# Patient Record
Sex: FEMALE | Race: WHITE | Employment: FULL TIME | URBAN - METROPOLITAN AREA
[De-identification: names, ages, dates, MRNs, and addresses within clinical notes are randomized per-mention and may not be internally consistent; named-entity substitution may affect disease eponyms.]

---

## 2017-01-13 ENCOUNTER — OFFICE VISIT (OUTPATIENT)
Dept: PERINATAL CARE | Facility: HOSPITAL | Age: 42
End: 2017-01-13
Attending: OBSTETRICS & GYNECOLOGY
Payer: COMMERCIAL

## 2017-01-13 VITALS
WEIGHT: 202 LBS | DIASTOLIC BLOOD PRESSURE: 76 MMHG | BODY MASS INDEX: 30 KG/M2 | HEART RATE: 101 BPM | SYSTOLIC BLOOD PRESSURE: 112 MMHG

## 2017-01-13 DIAGNOSIS — O09.812 PREGNANCY RESULTING FROM IN VITRO FERTILIZATION, SECOND TRIMESTER: ICD-10-CM

## 2017-01-13 DIAGNOSIS — O09.522 AMA (ADVANCED MATERNAL AGE) MULTIGRAVIDA 35+, SECOND TRIMESTER: Primary | ICD-10-CM

## 2017-01-13 PROBLEM — O09.819 PREGNANCY RESULTING FROM IN VITRO FERTILIZATION (HCC): Status: ACTIVE | Noted: 2017-01-13

## 2017-01-13 PROBLEM — O09.819 PREGNANCY RESULTING FROM IN VITRO FERTILIZATION: Status: ACTIVE | Noted: 2017-01-13

## 2017-01-13 PROCEDURE — 76825 ECHO EXAM OF FETAL HEART: CPT

## 2017-01-13 PROCEDURE — 99214 OFFICE O/P EST MOD 30 MIN: CPT

## 2017-01-13 PROCEDURE — 76827 ECHO EXAM OF FETAL HEART: CPT

## 2017-01-13 PROCEDURE — 93325 DOPPLER ECHO COLOR FLOW MAPG: CPT

## 2017-01-13 NOTE — PROGRESS NOTES
Indication: IVF.   ____________________________________________________________________________  History: Age: 39 years. Maternal age at [facility]: 39 years.  Last menstrual period: 07/30/2016.  ____________________________________________________________________ of pericardial or pleural effusion. The A-V conduction is one to one and the heart rate is appropriate for gestational age. No evidence of fetal arrhythmias is seen during today's study. There is a right to left shunting across the patent foramen ovale.

## 2017-01-30 PROCEDURE — 82950 GLUCOSE TEST: CPT | Performed by: OBSTETRICS & GYNECOLOGY

## 2017-02-13 ENCOUNTER — HOSPITAL ENCOUNTER (OUTPATIENT)
Facility: HOSPITAL | Age: 42
Setting detail: OBSERVATION
Discharge: HOME | End: 2017-02-13
Attending: OBSTETRICS & GYNECOLOGY | Admitting: OBSTETRICS & GYNECOLOGY
Payer: COMMERCIAL

## 2017-02-13 VITALS
DIASTOLIC BLOOD PRESSURE: 77 MMHG | SYSTOLIC BLOOD PRESSURE: 121 MMHG | WEIGHT: 211 LBS | TEMPERATURE: 98 F | HEART RATE: 96 BPM | HEIGHT: 69 IN | RESPIRATION RATE: 18 BRPM | BODY MASS INDEX: 31.25 KG/M2

## 2017-02-13 PROBLEM — Z34.90 PREGNANCY: Status: ACTIVE | Noted: 2017-02-13

## 2017-02-13 PROBLEM — Z34.90 PREGNANCY (HCC): Status: ACTIVE | Noted: 2017-02-13

## 2017-02-13 LAB
BILIRUBIN URINE: NEGATIVE
BLOOD URINE: NEGATIVE
CONTROL RUN WITHIN 24 HOURS?: YES
GLUCOSE URINE: NEGATIVE
KETONE URINE: NEGATIVE
LEUKOCYTE ESTERASE URINE: NEGATIVE
NITRITE URINE: NEGATIVE
PH URINE: 7 (ref 5–8)
PROTEIN URINE: NEGATIVE
SPEC GRAVITY: 1.01 (ref 1–1.03)
URINE CLARITY: CLEAR
URINE COLOR: YELLOW
UROBILINOGEN URINE: 0.2

## 2017-02-13 PROCEDURE — 81002 URINALYSIS NONAUTO W/O SCOPE: CPT

## 2017-02-13 NOTE — PROGRESS NOTES
Pt is a 39year old female admitted to TR5/TR5-A. Patient presents with:  Decreased Fetal Movement     Pt is  28w2d intra-uterine pregnancy. History obtained, consents signed. Oriented to room, staff, and plan of care.

## 2017-02-13 NOTE — PROGRESS NOTES
Pt given discharge instructions. Pt verbalizes understanding of all.  Pt is comfortable going home and will follow up with her doctor on feb 21st.

## 2017-02-21 ENCOUNTER — HOSPITAL ENCOUNTER (OUTPATIENT)
Facility: HOSPITAL | Age: 42
Setting detail: OBSERVATION
Discharge: HOME | End: 2017-02-21
Attending: OBSTETRICS & GYNECOLOGY | Admitting: OBSTETRICS & GYNECOLOGY
Payer: COMMERCIAL

## 2017-02-21 VITALS
HEIGHT: 69 IN | HEART RATE: 82 BPM | DIASTOLIC BLOOD PRESSURE: 76 MMHG | SYSTOLIC BLOOD PRESSURE: 120 MMHG | BODY MASS INDEX: 32.07 KG/M2 | WEIGHT: 216.5 LBS

## 2017-02-21 LAB
ALBUMIN SERPL-MCNC: 2.7 G/DL (ref 3.5–4.8)
ALP LIVER SERPL-CCNC: 87 U/L (ref 37–98)
ALT SERPL-CCNC: 37 U/L (ref 14–54)
AST SERPL-CCNC: 25 U/L (ref 15–41)
BASOPHILS # BLD AUTO: 0.04 X10(3) UL (ref 0–0.1)
BASOPHILS NFR BLD AUTO: 0.4 %
BILIRUB SERPL-MCNC: 0.2 MG/DL (ref 0.1–2)
BILIRUBIN URINE: NEGATIVE
BLOOD URINE: NEGATIVE
BUN BLD-MCNC: 7 MG/DL (ref 8–20)
CALCIUM BLD-MCNC: 8.8 MG/DL (ref 8.3–10.3)
CHLORIDE: 107 MMOL/L (ref 101–111)
CO2: 23 MMOL/L (ref 22–32)
CONTROL RUN WITHIN 24 HOURS?: YES
CREAT BLD-MCNC: 0.51 MG/DL (ref 0.55–1.02)
EOSINOPHIL # BLD AUTO: 0.08 X10(3) UL (ref 0–0.3)
EOSINOPHIL NFR BLD AUTO: 0.8 %
ERYTHROCYTE [DISTWIDTH] IN BLOOD BY AUTOMATED COUNT: 13.2 % (ref 11.5–16)
GLUCOSE BLD-MCNC: 95 MG/DL (ref 70–99)
GLUCOSE URINE: NEGATIVE
HCT VFR BLD AUTO: 37.1 % (ref 34–50)
HGB BLD-MCNC: 13.1 G/DL (ref 12–16)
IMMATURE GRANULOCYTE COUNT: 0.12 X10(3) UL (ref 0–1)
IMMATURE GRANULOCYTE RATIO %: 1.2 %
KETONE URINE: NEGATIVE
LEUKOCYTE ESTERASE URINE: NEGATIVE
LYMPHOCYTES # BLD AUTO: 1.95 X10(3) UL (ref 0.9–4)
LYMPHOCYTES NFR BLD AUTO: 20.1 %
M PROTEIN MFR SERPL ELPH: 6.2 G/DL (ref 6.1–8.3)
MCH RBC QN AUTO: 32.4 PG (ref 27–33.2)
MCHC RBC AUTO-ENTMCNC: 35.3 G/DL (ref 31–37)
MCV RBC AUTO: 91.8 FL (ref 81–100)
MONOCYTES # BLD AUTO: 0.9 X10(3) UL (ref 0.1–0.6)
MONOCYTES NFR BLD AUTO: 9.3 %
NEUTROPHIL ABS PRELIM: 6.6 X10 (3) UL (ref 1.3–6.7)
NEUTROPHILS # BLD AUTO: 6.6 X10(3) UL (ref 1.3–6.7)
NEUTROPHILS NFR BLD AUTO: 68.2 %
NITRITE URINE: NEGATIVE
PH URINE: 6.5 (ref 5–8)
PLATELET # BLD AUTO: 284 10(3)UL (ref 150–450)
POTASSIUM SERPL-SCNC: 3.8 MMOL/L (ref 3.6–5.1)
PROTEIN URINE: NEGATIVE
RBC # BLD AUTO: 4.04 X10(6)UL (ref 3.8–5.1)
RED CELL DISTRIBUTION WIDTH-SD: 43.8 FL (ref 35.1–46.3)
SODIUM SERPL-SCNC: 139 MMOL/L (ref 136–144)
SPEC GRAVITY: 1.01 (ref 1–1.03)
URIC ACID: 2.2 MG/DL (ref 2.4–8)
URINE CLARITY: CLEAR
URINE COLOR: YELLOW
UROBILINOGEN URINE: 0.2
WBC # BLD AUTO: 9.7 X10(3) UL (ref 4–13)

## 2017-02-21 PROCEDURE — 84550 ASSAY OF BLOOD/URIC ACID: CPT | Performed by: OBSTETRICS & GYNECOLOGY

## 2017-02-21 PROCEDURE — 81002 URINALYSIS NONAUTO W/O SCOPE: CPT

## 2017-02-21 PROCEDURE — 85025 COMPLETE CBC W/AUTO DIFF WBC: CPT | Performed by: OBSTETRICS & GYNECOLOGY

## 2017-02-21 PROCEDURE — 80053 COMPREHEN METABOLIC PANEL: CPT | Performed by: OBSTETRICS & GYNECOLOGY

## 2017-02-22 NOTE — PROGRESS NOTES
Discharge instruction given to pt, pt going home ambulatory accompanied with spouse in stable condition, all pt belongings sent with pt on discharge.

## 2017-02-22 NOTE — PROGRESS NOTES
Updated  about the pt BP and PIH labs WNL  Orders received to discharge pt home and follow up in the office next weeks with BP recordings at home  RN informed the pt to check BP TID @ home and record and if BP above 150/90 x 2 15 min apart call

## 2017-03-11 ENCOUNTER — APPOINTMENT (OUTPATIENT)
Dept: ULTRASOUND IMAGING | Facility: HOSPITAL | Age: 42
End: 2017-03-11
Attending: OBSTETRICS & GYNECOLOGY
Payer: COMMERCIAL

## 2017-03-11 ENCOUNTER — HOSPITAL ENCOUNTER (INPATIENT)
Facility: HOSPITAL | Age: 42
LOS: 5 days | Discharge: HOME | End: 2017-03-16
Attending: OBSTETRICS & GYNECOLOGY | Admitting: OBSTETRICS & GYNECOLOGY
Payer: COMMERCIAL

## 2017-03-11 PROCEDURE — 86901 BLOOD TYPING SEROLOGIC RH(D): CPT | Performed by: OBSTETRICS & GYNECOLOGY

## 2017-03-11 PROCEDURE — 76805 OB US >/= 14 WKS SNGL FETUS: CPT

## 2017-03-11 PROCEDURE — 82575 CREATININE CLEARANCE TEST: CPT | Performed by: OBSTETRICS & GYNECOLOGY

## 2017-03-11 PROCEDURE — 84156 ASSAY OF PROTEIN URINE: CPT | Performed by: OBSTETRICS & GYNECOLOGY

## 2017-03-11 PROCEDURE — 86850 RBC ANTIBODY SCREEN: CPT | Performed by: OBSTETRICS & GYNECOLOGY

## 2017-03-11 PROCEDURE — 85025 COMPLETE CBC W/AUTO DIFF WBC: CPT | Performed by: OBSTETRICS & GYNECOLOGY

## 2017-03-11 PROCEDURE — 80053 COMPREHEN METABOLIC PANEL: CPT | Performed by: OBSTETRICS & GYNECOLOGY

## 2017-03-11 PROCEDURE — 84550 ASSAY OF BLOOD/URIC ACID: CPT | Performed by: OBSTETRICS & GYNECOLOGY

## 2017-03-11 PROCEDURE — 86900 BLOOD TYPING SEROLOGIC ABO: CPT | Performed by: OBSTETRICS & GYNECOLOGY

## 2017-03-11 PROCEDURE — 82565 ASSAY OF CREATININE: CPT | Performed by: OBSTETRICS & GYNECOLOGY

## 2017-03-11 RX ORDER — DOCUSATE SODIUM 100 MG/1
100 CAPSULE, LIQUID FILLED ORAL 2 TIMES DAILY
Status: DISCONTINUED | OUTPATIENT
Start: 2017-03-11 | End: 2017-03-14

## 2017-03-11 RX ORDER — BETAMETHASONE SODIUM PHOSPHATE AND BETAMETHASONE ACETATE 3; 3 MG/ML; MG/ML
INJECTION, SUSPENSION INTRA-ARTICULAR; INTRALESIONAL; INTRAMUSCULAR; SOFT TISSUE
Status: DISPENSED
Start: 2017-03-11 | End: 2017-03-12

## 2017-03-11 RX ORDER — SODIUM CHLORIDE, SODIUM LACTATE, POTASSIUM CHLORIDE, CALCIUM CHLORIDE 600; 310; 30; 20 MG/100ML; MG/100ML; MG/100ML; MG/100ML
INJECTION, SOLUTION INTRAVENOUS CONTINUOUS
Status: DISCONTINUED | OUTPATIENT
Start: 2017-03-11 | End: 2017-03-13

## 2017-03-11 RX ORDER — ACETAMINOPHEN 500 MG
500 TABLET ORAL EVERY 6 HOURS PRN
Status: DISCONTINUED | OUTPATIENT
Start: 2017-03-11 | End: 2017-03-14

## 2017-03-11 RX ORDER — BETAMETHASONE SODIUM PHOSPHATE AND BETAMETHASONE ACETATE 3; 3 MG/ML; MG/ML
12 INJECTION, SUSPENSION INTRA-ARTICULAR; INTRALESIONAL; INTRAMUSCULAR; SOFT TISSUE EVERY 24 HOURS
Status: COMPLETED | OUTPATIENT
Start: 2017-03-11 | End: 2017-03-12

## 2017-03-11 RX ORDER — ZOLPIDEM TARTRATE 5 MG/1
5 TABLET ORAL NIGHTLY PRN
Status: DISCONTINUED | OUTPATIENT
Start: 2017-03-11 | End: 2017-03-14

## 2017-03-11 RX ORDER — CALCIUM CARBONATE 200(500)MG
1000 TABLET,CHEWABLE ORAL
Status: DISCONTINUED | OUTPATIENT
Start: 2017-03-11 | End: 2017-03-14

## 2017-03-11 RX ORDER — ACETAMINOPHEN 500 MG
1000 TABLET ORAL EVERY 6 HOURS PRN
Status: DISCONTINUED | OUTPATIENT
Start: 2017-03-11 | End: 2017-03-14

## 2017-03-11 RX ORDER — CHOLECALCIFEROL (VITAMIN D3) 25 MCG
1 TABLET,CHEWABLE ORAL DAILY
Status: DISCONTINUED | OUTPATIENT
Start: 2017-03-11 | End: 2017-03-14

## 2017-03-11 RX ORDER — CALCIUM GLUCONATE 94 MG/ML
1 INJECTION, SOLUTION INTRAVENOUS ONCE AS NEEDED
Status: ACTIVE | OUTPATIENT
Start: 2017-03-11 | End: 2017-03-11

## 2017-03-12 LAB
ALBUMIN SERPL-MCNC: 2.6 G/DL (ref 3.5–4.8)
ALP LIVER SERPL-CCNC: 116 U/L (ref 37–98)
ALT SERPL-CCNC: 41 U/L (ref 14–54)
ANTIBODY SCREEN: NEGATIVE
AST SERPL-CCNC: 26 U/L (ref 15–41)
BASOPHILS # BLD AUTO: 0.05 X10(3) UL (ref 0–0.1)
BASOPHILS NFR BLD AUTO: 0.3 %
BILIRUB SERPL-MCNC: 0.1 MG/DL (ref 0.1–2)
BILIRUBIN URINE: NEGATIVE
BLOOD URINE: NEGATIVE
BUN BLD-MCNC: 13 MG/DL (ref 8–20)
CALCIUM BLD-MCNC: 9.3 MG/DL (ref 8.3–10.3)
CHLORIDE: 107 MMOL/L (ref 101–111)
CO2: 24 MMOL/L (ref 22–32)
CONTROL RUN WITHIN 24 HOURS?: YES
CREAT BLD-MCNC: 0.58 MG/DL (ref 0.55–1.02)
EOSINOPHIL # BLD AUTO: 0.13 X10(3) UL (ref 0–0.3)
EOSINOPHIL NFR BLD AUTO: 0.9 %
ERYTHROCYTE [DISTWIDTH] IN BLOOD BY AUTOMATED COUNT: 13 % (ref 11.5–16)
GLUCOSE BLD-MCNC: 113 MG/DL (ref 70–99)
GLUCOSE URINE: NEGATIVE
HCT VFR BLD AUTO: 36 % (ref 34–50)
HGB BLD-MCNC: 12.9 G/DL (ref 12–16)
IMMATURE GRANULOCYTE COUNT: 0.09 X10(3) UL (ref 0–1)
IMMATURE GRANULOCYTE RATIO %: 0.6 %
KETONE URINE: NEGATIVE
LYMPHOCYTES # BLD AUTO: 2.16 X10(3) UL (ref 0.9–4)
LYMPHOCYTES NFR BLD AUTO: 14.4 %
M PROTEIN MFR SERPL ELPH: 5.5 G/DL (ref 6.1–8.3)
MCH RBC QN AUTO: 32.8 PG (ref 27–33.2)
MCHC RBC AUTO-ENTMCNC: 35.8 G/DL (ref 31–37)
MCV RBC AUTO: 91.6 FL (ref 81–100)
MONOCYTES # BLD AUTO: 1.3 X10(3) UL (ref 0.1–0.6)
MONOCYTES NFR BLD AUTO: 8.7 %
NEUTROPHIL ABS PRELIM: 11.24 X10 (3) UL (ref 1.3–6.7)
NEUTROPHILS # BLD AUTO: 11.24 X10(3) UL (ref 1.3–6.7)
NEUTROPHILS NFR BLD AUTO: 75.1 %
NITRITE URINE: NEGATIVE
PH URINE: 5.5 (ref 5–8)
PLATELET # BLD AUTO: 279 10(3)UL (ref 150–450)
POTASSIUM SERPL-SCNC: 4 MMOL/L (ref 3.6–5.1)
PROTEIN URINE: NEGATIVE
RBC # BLD AUTO: 3.93 X10(6)UL (ref 3.8–5.1)
RED CELL DISTRIBUTION WIDTH-SD: 43 FL (ref 35.1–46.3)
RH BLOOD TYPE: POSITIVE
SODIUM SERPL-SCNC: 141 MMOL/L (ref 136–144)
SPEC GRAVITY: 1 (ref 1–1.03)
T PALLIDUM AB SER QL IA: NONREACTIVE
URIC ACID: 2.7 MG/DL (ref 2.4–8)
URINE CLARITY: CLEAR
URINE COLOR: YELLOW
UROBILINOGEN URINE: 0.2
WBC # BLD AUTO: 15 X10(3) UL (ref 4–13)

## 2017-03-12 PROCEDURE — 81002 URINALYSIS NONAUTO W/O SCOPE: CPT

## 2017-03-12 PROCEDURE — 86780 TREPONEMA PALLIDUM: CPT | Performed by: OBSTETRICS & GYNECOLOGY

## 2017-03-12 RX ORDER — BUPROPION HYDROCHLORIDE 150 MG/1
150 TABLET, EXTENDED RELEASE ORAL DAILY
Status: DISCONTINUED | OUTPATIENT
Start: 2017-03-12 | End: 2017-03-14

## 2017-03-12 RX ORDER — ACETAMINOPHEN AND CODEINE PHOSPHATE 300; 30 MG/1; MG/1
2 TABLET ORAL EVERY 4 HOURS PRN
Status: DISCONTINUED | OUTPATIENT
Start: 2017-03-12 | End: 2017-03-16

## 2017-03-12 RX ORDER — NIFEDIPINE 20 MG/1
20 CAPSULE ORAL EVERY 6 HOURS SCHEDULED
Status: DISCONTINUED | OUTPATIENT
Start: 2017-03-12 | End: 2017-03-14

## 2017-03-12 RX ORDER — DESVENLAFAXINE 100 MG/1
100 TABLET, EXTENDED RELEASE ORAL DAILY
Status: DISCONTINUED | OUTPATIENT
Start: 2017-03-12 | End: 2017-03-14

## 2017-03-12 RX ORDER — HYDROMORPHONE HYDROCHLORIDE 1 MG/ML
2 INJECTION, SOLUTION INTRAMUSCULAR; INTRAVENOUS; SUBCUTANEOUS EVERY 2 HOUR PRN
Status: DISCONTINUED | OUTPATIENT
Start: 2017-03-12 | End: 2017-03-13

## 2017-03-12 RX ORDER — HYDROMORPHONE HYDROCHLORIDE 1 MG/ML
1 INJECTION, SOLUTION INTRAMUSCULAR; INTRAVENOUS; SUBCUTANEOUS EVERY 2 HOUR PRN
Status: DISCONTINUED | OUTPATIENT
Start: 2017-03-12 | End: 2017-03-13

## 2017-03-12 NOTE — PLAN OF CARE
Dr Jay Jay Porter notified of patient including fetal heart rate and uterine activity. md notified this rn is not picking up uterine activity, and is unable to palpate contractions.  md notified patient is feeling lower abdominal pain every 4-5 minutes, feels like sh

## 2017-03-12 NOTE — PLAN OF CARE
Pt is a 39year old  at 32 weeks here for lower abdominal pain. Pt states pain began between 4786-5148, is constant, and located all in her lower abdomen with a little more intensity towards her right groin area.  Pt denies any difficulty urinating or b

## 2017-03-12 NOTE — CONSULTS
[facility]  Maternal-Fetal Medicine Inpatient Consultation    Date of Admission:  3/11/2017  Date of Consult:  3/12/2017    Reason for Consult:    labor    History of Present Illness:  Ke Cortés is a a(n) 39year old female.   wit Intravenous, Q2H PRN  •  BuPROPion HCl ER (SR) (WELLBUTRIN SR) 12 hr tab 150 mg, 150 mg, Oral, Daily  •  Desvenlafaxine Succinate ER (PRISTIQ) 24 hr tab 100 mg, 100 mg, Oral, Daily  •  [START ON 3/15/2017] influenza vaccine split quad (FLUARIX) ages 1 & ol 03/11/2017    03/11/2017   CA 9.3 03/11/2017   ALB 2.6 03/11/2017   ALKPHO 116 03/11/2017   BILT 0.1 03/11/2017   TP 5.5 03/11/2017   AST 26 03/11/2017   ALT 41 03/11/2017       NARRATIVE:   We discussed the morbidity and mortality associated with p  labor can be a complication of infections unrelated to the genital tract, such as appendicitis, cholecystitis, pneumonia, periodontal disease, and pyelonephritis.  This likely results from transient low-grade bacteremia and/or endotoxemia leading to expect that she will be in the hospital for 2-3 days. Thank you for allowing me to participate in the care of your patient. Please do not hesitate to contact me if additional questions or concerns arise.   The majority of the time (>50%) was spent in re

## 2017-03-12 NOTE — H&P
[facility] Angella Melendrez Patient Status:  Observation    1975 MRN [id number]   [facility] 1NW-A Attending Alexandria Arias MD   Hosp Day # 1 PCP David Curtis MD     Subjective:  38 yo  at 28

## 2017-03-12 NOTE — PROGRESS NOTES
At 0815: Pt given Tylenol 1000mg for new onset headache (pain 3/10) and now states HA is \"better\" (pain 1/10). Pt sitting semi-fowlers in bed, visitors at bedside. Pt able to brush teeth, wash face and change gown without complaint.  Pt denies contrac

## 2017-03-12 NOTE — PLAN OF CARE
Pt up to bathroom, states there was blood upon wiping. Pt states it was vaginal bleeding. This rn called dr Divina Pinon, requesting to perform SVE.  Orders rec'd

## 2017-03-13 PROCEDURE — 76805 OB US >/= 14 WKS SNGL FETUS: CPT | Performed by: OBSTETRICS & GYNECOLOGY

## 2017-03-13 PROCEDURE — 76819 FETAL BIOPHYS PROFIL W/O NST: CPT | Performed by: OBSTETRICS & GYNECOLOGY

## 2017-03-13 RX ORDER — EPHEDRINE SULFATE 50 MG/ML
5 INJECTION, SOLUTION INTRAVENOUS AS NEEDED
Status: DISCONTINUED | OUTPATIENT
Start: 2017-03-13 | End: 2017-03-14

## 2017-03-13 RX ORDER — HYDROMORPHONE HYDROCHLORIDE 1 MG/ML
1 INJECTION, SOLUTION INTRAMUSCULAR; INTRAVENOUS; SUBCUTANEOUS EVERY 2 HOUR PRN
Status: DISCONTINUED | OUTPATIENT
Start: 2017-03-13 | End: 2017-03-16

## 2017-03-13 RX ORDER — DEXTROSE, SODIUM CHLORIDE, SODIUM LACTATE, POTASSIUM CHLORIDE, AND CALCIUM CHLORIDE 5; .6; .31; .03; .02 G/100ML; G/100ML; G/100ML; G/100ML; G/100ML
INJECTION, SOLUTION INTRAVENOUS AS NEEDED
Status: DISCONTINUED | OUTPATIENT
Start: 2017-03-13 | End: 2017-03-14

## 2017-03-13 RX ORDER — HYDROMORPHONE HYDROCHLORIDE 1 MG/ML
INJECTION, SOLUTION INTRAMUSCULAR; INTRAVENOUS; SUBCUTANEOUS
Status: COMPLETED
Start: 2017-03-13 | End: 2017-03-13

## 2017-03-13 RX ORDER — TERBUTALINE SULFATE 1 MG/ML
0.25 INJECTION, SOLUTION SUBCUTANEOUS AS NEEDED
Status: DISCONTINUED | OUTPATIENT
Start: 2017-03-13 | End: 2017-03-14

## 2017-03-13 RX ORDER — IBUPROFEN 600 MG/1
600 TABLET ORAL ONCE AS NEEDED
Status: DISCONTINUED | OUTPATIENT
Start: 2017-03-13 | End: 2017-03-14

## 2017-03-13 RX ORDER — SODIUM CHLORIDE, SODIUM LACTATE, POTASSIUM CHLORIDE, CALCIUM CHLORIDE 600; 310; 30; 20 MG/100ML; MG/100ML; MG/100ML; MG/100ML
INJECTION, SOLUTION INTRAVENOUS CONTINUOUS
Status: DISCONTINUED | OUTPATIENT
Start: 2017-03-13 | End: 2017-03-14

## 2017-03-13 RX ORDER — SODIUM CHLORIDE, SODIUM LACTATE, POTASSIUM CHLORIDE, CALCIUM CHLORIDE 600; 310; 30; 20 MG/100ML; MG/100ML; MG/100ML; MG/100ML
INJECTION, SOLUTION INTRAVENOUS CONTINUOUS
Status: DISCONTINUED | OUTPATIENT
Start: 2017-03-13 | End: 2017-03-13

## 2017-03-13 RX ORDER — HYDROMORPHONE HYDROCHLORIDE 1 MG/ML
2 INJECTION, SOLUTION INTRAMUSCULAR; INTRAVENOUS; SUBCUTANEOUS EVERY 2 HOUR PRN
Status: DISCONTINUED | OUTPATIENT
Start: 2017-03-13 | End: 2017-03-13

## 2017-03-13 RX ORDER — NALBUPHINE HCL 10 MG/ML
2.5 AMPUL (ML) INJECTION
Status: DISCONTINUED | OUTPATIENT
Start: 2017-03-13 | End: 2017-03-16

## 2017-03-13 NOTE — CONSULTS
[facility]  Maternal-Fetal Medicine Inpatient Consultation    Date of Admission:  3/11/2017  Date of Consult:  3/13/2017    Reason for Consult:    labor    History of Present Illness:  Bess Hurst is a a(n) 39year old female.    Wh Intravenous, Continuous    Physical examination:  Physical Exam  /67 mmHg  Pulse 68  Temp(Src) 98.1 °F (36.7 °C) (Temporal)  Resp 16  Ht 5' 9\" (1.753 m)  SpO2 98%  LMP 07/30/2016  Gen:  A&O, NAD  Abd: soft, nontender gravid uterus  Ext: trace edema,

## 2017-03-13 NOTE — IMAGING NOTE
Indication: ivf,premature labor. Maternal age (39 years). ____________________________________________________________________________  History: Age: 39 years. Maternal age at [facility]: 39 years.   ____________________________________________________________ seen today. The AFV is normal. She understands that ultrasound exam cannot exclude genetic abnormalities. The limitations of ultrasound were discussed. IMPRESSION:    1.  IUP at 32 2/7 wks    2. Scan consistent with dates  3.  depression  4.   A

## 2017-03-13 NOTE — PLAN OF CARE
Problem: ANXIETY  Goal: Will report anxiety at manageable levels  INTERVENTIONS:  - Administer medication as ordered  - Teach and rehearse alternative coping skills  - Provide emotional support with 1:1 interaction with staff   Outcome: Progressing

## 2017-03-13 NOTE — PROGRESS NOTES
HD#2, 32 2/7 weeks  Patient with increased UCx over last 2 hours, Procardia given at 0600. +bloody show when up to BR. Cramping at 6/10 but not as painful as admission.   S/p BMTZ x 2 doses  IV bolus in progress    , mod LTV + accels, no decels  UCx

## 2017-03-13 NOTE — PROGRESS NOTES
now32 2/7 weeks with PTL. Cramping this AM after resting comfortably after Ambien. Reactive NST. Report to WASHINGTON Womack RN

## 2017-03-13 NOTE — PLAN OF CARE
Problem: Patient/Family Goals  Goal: Patient/Family Long Term Goal  Patient’s Long Term Goal: to maintain pregnancy until 39 weeks    Interventions:    - See additional Care Plan goals for specific interventions   Outcome: Progressing  Goal: Patient/Family

## 2017-03-14 LAB
BASOPHILS # BLD AUTO: 0.06 X10(3) UL (ref 0–0.1)
BASOPHILS NFR BLD AUTO: 0.4 %
EOSINOPHIL # BLD AUTO: 0.04 X10(3) UL (ref 0–0.3)
EOSINOPHIL NFR BLD AUTO: 0.2 %
ERYTHROCYTE [DISTWIDTH] IN BLOOD BY AUTOMATED COUNT: 13.3 % (ref 11.5–16)
HCT VFR BLD AUTO: 34.7 % (ref 34–50)
HGB BLD-MCNC: 12.3 G/DL (ref 12–16)
IMMATURE GRANULOCYTE COUNT: 0.2 X10(3) UL (ref 0–1)
IMMATURE GRANULOCYTE RATIO %: 1.2 %
LYMPHOCYTES # BLD AUTO: 1.79 X10(3) UL (ref 0.9–4)
LYMPHOCYTES NFR BLD AUTO: 10.8 %
MCH RBC QN AUTO: 32.9 PG (ref 27–33.2)
MCHC RBC AUTO-ENTMCNC: 35.4 G/DL (ref 31–37)
MCV RBC AUTO: 92.8 FL (ref 81–100)
MONOCYTES # BLD AUTO: 1.97 X10(3) UL (ref 0.1–0.6)
MONOCYTES NFR BLD AUTO: 11.9 %
NEUTROPHIL ABS PRELIM: 12.5 X10 (3) UL (ref 1.3–6.7)
NEUTROPHILS # BLD AUTO: 12.5 X10(3) UL (ref 1.3–6.7)
NEUTROPHILS NFR BLD AUTO: 75.5 %
PLATELET # BLD AUTO: 249 10(3)UL (ref 150–450)
RBC # BLD AUTO: 3.74 X10(6)UL (ref 3.8–5.1)
RED CELL DISTRIBUTION WIDTH-SD: 45 FL (ref 35.1–46.3)
WBC # BLD AUTO: 16.6 X10(3) UL (ref 4–13)

## 2017-03-14 PROCEDURE — 88307 TISSUE EXAM BY PATHOLOGIST: CPT | Performed by: OBSTETRICS & GYNECOLOGY

## 2017-03-14 PROCEDURE — 85025 COMPLETE CBC W/AUTO DIFF WBC: CPT | Performed by: OBSTETRICS & GYNECOLOGY

## 2017-03-14 RX ORDER — HYDROCODONE BITARTRATE AND ACETAMINOPHEN 5; 325 MG/1; MG/1
1 TABLET ORAL EVERY 4 HOURS PRN
Status: DISCONTINUED | OUTPATIENT
Start: 2017-03-14 | End: 2017-03-16

## 2017-03-14 RX ORDER — DESVENLAFAXINE 100 MG/1
100 TABLET, EXTENDED RELEASE ORAL DAILY
Status: DISCONTINUED | OUTPATIENT
Start: 2017-03-14 | End: 2017-03-16

## 2017-03-14 RX ORDER — OXYTOCIN 10 [USP'U]/ML
INJECTION, SOLUTION INTRAMUSCULAR; INTRAVENOUS
Status: DISPENSED
Start: 2017-03-14 | End: 2017-03-14

## 2017-03-14 RX ORDER — DOCUSATE SODIUM 100 MG/1
100 CAPSULE, LIQUID FILLED ORAL
Status: DISCONTINUED | OUTPATIENT
Start: 2017-03-14 | End: 2017-03-16

## 2017-03-14 RX ORDER — SIMETHICONE 80 MG
80 TABLET,CHEWABLE ORAL 3 TIMES DAILY PRN
Status: DISCONTINUED | OUTPATIENT
Start: 2017-03-14 | End: 2017-03-16

## 2017-03-14 RX ORDER — IBUPROFEN 600 MG/1
600 TABLET ORAL EVERY 6 HOURS
Status: DISCONTINUED | OUTPATIENT
Start: 2017-03-14 | End: 2017-03-16

## 2017-03-14 RX ORDER — BUPROPION HYDROCHLORIDE 75 MG/1
150 TABLET ORAL 2 TIMES DAILY
Status: DISCONTINUED | OUTPATIENT
Start: 2017-03-14 | End: 2017-03-16

## 2017-03-14 RX ORDER — OXYTOCIN 10 [USP'U]/ML
20 INJECTION, SOLUTION INTRAMUSCULAR; INTRAVENOUS ONCE
Status: DISCONTINUED | OUTPATIENT
Start: 2017-03-14 | End: 2017-03-14

## 2017-03-14 RX ORDER — HYDROCODONE BITARTRATE AND ACETAMINOPHEN 5; 325 MG/1; MG/1
2 TABLET ORAL EVERY 4 HOURS PRN
Status: DISCONTINUED | OUTPATIENT
Start: 2017-03-14 | End: 2017-03-16

## 2017-03-14 RX ORDER — ACETAMINOPHEN 325 MG/1
650 TABLET ORAL EVERY 4 HOURS PRN
Status: DISCONTINUED | OUTPATIENT
Start: 2017-03-14 | End: 2017-03-16

## 2017-03-14 RX ORDER — BISACODYL 10 MG
10 SUPPOSITORY, RECTAL RECTAL ONCE AS NEEDED
Status: ACTIVE | OUTPATIENT
Start: 2017-03-14 | End: 2017-03-14

## 2017-03-14 RX ORDER — CHOLECALCIFEROL (VITAMIN D3) 25 MCG
1 TABLET,CHEWABLE ORAL DAILY
Status: DISCONTINUED | OUTPATIENT
Start: 2017-03-14 | End: 2017-03-16

## 2017-03-14 RX ORDER — ZOLPIDEM TARTRATE 5 MG/1
5 TABLET ORAL NIGHTLY PRN
Status: DISCONTINUED | OUTPATIENT
Start: 2017-03-14 | End: 2017-03-16

## 2017-03-14 RX ORDER — MULTIPLE VITAMINS W/ MINERALS TAB 9MG-400MCG
1 TAB ORAL DAILY
Status: DISCONTINUED | OUTPATIENT
Start: 2017-03-14 | End: 2017-03-16

## 2017-03-14 NOTE — PROGRESS NOTES
Patient had been sleeping this morning, asked  to have her let us know when awake for assessment. Went to check on patient and she is not in room, up in nicu.

## 2017-03-14 NOTE — PROGRESS NOTES
Pt admit to mother baby unit instructed to call for assistance when needs to get up to void, also instructed how to use call light tv. Verb understanding of all instructions and call light with in reach. Set up and instructed how to use breast pump.

## 2017-03-14 NOTE — PROGRESS NOTES
Report given to Carmen Andre RN. Patient taken to NICU via wheelchair, then transferred to mother baby unit in stable condition.

## 2017-03-14 NOTE — PROGRESS NOTES
[facility]  Post-Partum Progress Note    [facility]:  [facility]    1975 MRN [id number]   [facility] 1SW-J Attending Ewa José MD   Hosp Day # 3 PCP Joan Buchanan MD     SUBJECTIVE:  Patient doing we

## 2017-03-14 NOTE — PROGRESS NOTES
Patient had Brady consult today. Awaiting change of shift and then NICU will provide tour for her and spouse. Report to oncoming night shift.

## 2017-03-14 NOTE — PLAN OF CARE
ANTEPARTUM/LABOR and DELIVERY    • Maintain pregnancy as long as maternal and/or fetal condition is stable Completed    • Anxiety is at manageable level Completed    • Demonstrates ability to cope with hospitalization/illness Completed        ANXIETY    •

## 2017-03-14 NOTE — PAYOR COMM NOTE
Attending Physician: Maribel Diamond MD    Review Type: ADMISSION   Reviewer: Divina Vo       Date: March 14, 2017 - 11:45 AM  Payor: [employer]  Authorization Number: [id number]  Admit date: 3/11/2017 10:06 PM   Admitted from Emergency Dept.: no doses  IV bolus in progress    Patient began jazmyn with increased intensity around 6pm. SVE at 2-3 cm. Per discussion with Dr Keagan Rodriguez / [facility] this am no tocolysis. Patient s/p BMTZ X 2 doses and Magnesium for neuroprotection 2 days ago.   , mod

## 2017-03-14 NOTE — PROGRESS NOTES
Patient has been up on feet, into bathroom, had been up to NICU x 2 and out walking. Resting in bed now, will re-check BP.

## 2017-03-14 NOTE — L&D DELIVERY NOTE
Vaginal Delivery Note      Florentino Melendrez Patient Status:  Inpatient    1975 MRN [id number]   [facility] 1NW-A Attending Tino Swanson MD   Hosp Day # 3 PCP Jyotsna Mitchell,

## 2017-03-14 NOTE — BH PROGRESS NOTE
Behavioral health consult was ordered due to patient's EPDS score of 11. Writer attempted to meet with patient today however patient declined assessment due to plan to nap and visit infant in NICU.  Writer did discuss patient's behavioral health history wi

## 2017-03-14 NOTE — PROGRESS NOTES
Progress  Patient began jazmyn with increased intensity around 6pm. SVE at 2-3 cm. Per discussion with Dr Kassie Hand / [facility] this am no tocolysis. Patient s/p BMTZ X 2 doses and Magnesium for neuroprotection 2 days ago. , mod LTV + accels. UCx ever

## 2017-03-14 NOTE — PROGRESS NOTES
Patient left unit with , states needs to get some fresh air, advised she should not be leaving hospital building, states understanding but still going outside on grounds.

## 2017-03-14 NOTE — PLAN OF CARE
ANTEPARTUM/LABOR and DELIVERY    • Maintain pregnancy as long as maternal and/or fetal condition is stable Progressing    • Anxiety is at manageable level Progressing    • Demonstrates ability to cope with hospitalization/illness Progressing        ANXIETY

## 2017-03-14 NOTE — CM/SW NOTE
CM met with patient to review insurance and PCP for infant. Patient stated that infant will be added to their [employer] PPO and Dr Jeevan Rowe will be the PCP for infant.  CM reviewed Auth process and reminded parents to call insurance and let them know that she

## 2017-03-15 LAB
BASOPHILS # BLD AUTO: 0.08 X10(3) UL (ref 0–0.1)
BASOPHILS NFR BLD AUTO: 0.8 %
EOSINOPHIL # BLD AUTO: 0.24 X10(3) UL (ref 0–0.3)
EOSINOPHIL NFR BLD AUTO: 2.3 %
ERYTHROCYTE [DISTWIDTH] IN BLOOD BY AUTOMATED COUNT: 13.6 % (ref 11.5–16)
HCT VFR BLD AUTO: 37.1 % (ref 34–50)
HGB BLD-MCNC: 13.2 G/DL (ref 12–16)
IMMATURE GRANULOCYTE COUNT: 0.18 X10(3) UL (ref 0–1)
IMMATURE GRANULOCYTE RATIO %: 1.7 %
LYMPHOCYTES # BLD AUTO: 2.53 X10(3) UL (ref 0.9–4)
LYMPHOCYTES NFR BLD AUTO: 24.5 %
MCH RBC QN AUTO: 32.9 PG (ref 27–33.2)
MCHC RBC AUTO-ENTMCNC: 35.6 G/DL (ref 31–37)
MCV RBC AUTO: 92.5 FL (ref 81–100)
MONOCYTES # BLD AUTO: 1.04 X10(3) UL (ref 0.1–0.6)
MONOCYTES NFR BLD AUTO: 10.1 %
NEUTROPHIL ABS PRELIM: 6.27 X10 (3) UL (ref 1.3–6.7)
NEUTROPHILS # BLD AUTO: 6.27 X10(3) UL (ref 1.3–6.7)
NEUTROPHILS NFR BLD AUTO: 60.6 %
PLATELET # BLD AUTO: 277 10(3)UL (ref 150–450)
RBC # BLD AUTO: 4.01 X10(6)UL (ref 3.8–5.1)
RED CELL DISTRIBUTION WIDTH-SD: 45.4 FL (ref 35.1–46.3)
WBC # BLD AUTO: 10.3 X10(3) UL (ref 4–13)

## 2017-03-15 PROCEDURE — 85025 COMPLETE CBC W/AUTO DIFF WBC: CPT | Performed by: OBSTETRICS & GYNECOLOGY

## 2017-03-15 NOTE — PROGRESS NOTES
[facility]  Post-Partum Progress Note    [address] Patient Status:  Inpatient    1975 MRN [id number]   [facility] 1SW-J Attending Rosie De Oliveira MD   Hosp Day # 4 PCP Dante Betts MD     SUBJECTIVE:  Patient doing we

## 2017-03-15 NOTE — BH PROGRESS NOTE
Patient is declining full consult for behavioral health in response to EPDS score. Writer reviewed progress notes from SW, with whom she met today,  as well as spoke with primary RN.  Met with patient for short interview, in which she presented with natalya

## 2017-03-15 NOTE — CM/SW NOTE
03/15/17 1100   CM/SW Referral Data   Referral Source Social Work (self-referral); Patient   Reason for Referral Psychoscial assessment   Informant Patient       SW met with pt due to new born admission into the NICU.  Pt has just delivered her first chil

## 2017-03-16 VITALS
OXYGEN SATURATION: 98 % | HEIGHT: 69 IN | HEART RATE: 78 BPM | DIASTOLIC BLOOD PRESSURE: 91 MMHG | SYSTOLIC BLOOD PRESSURE: 130 MMHG | RESPIRATION RATE: 18 BRPM | TEMPERATURE: 98 F

## 2017-03-16 NOTE — PROGRESS NOTES
[facility]  Post-Partum Progress Note    [address] [place] Patient Status:  Inpatient    1975 MRN [id number]   [facility] 1SW-J Attending Maribel Diamond MD   Hosp Day # 5 PCP Antelmo Goodman MD     SUBJECTIVE:  Patient doing we

## 2017-03-16 NOTE — PROGRESS NOTES
Discharge teaching on mom care completed. All questions and concerns addressed. Pt verbalizes good understanding on information given. Anticipating discharge later today. Baby to remain in nicu, doing well.    EPDS = 11, denies ppd or bb now, on meds, all c

## 2017-03-16 NOTE — DISCHARGE SUMMARY
[facility]  Discharge Summary    Erin Melendrez Patient Status:  Inpatient    1975 MRN [id number]   [facility] 1SW-J Attending Hussain Pearce MD   Hosp Day # 5 PCP Shayan Young MD     Primary OB Clinician: Francesca Ojeda    EDC: Es

## 2017-03-16 NOTE — PROGRESS NOTES
Discharge instructions given to mom.  Mom stated undestanding with regards to self care , baby care and follow-up  appointments

## 2017-03-16 NOTE — PLAN OF CARE
ANXIETY    • Will report anxiety at manageable levels Completed        COPING    • Pt/Family able to verbalize concerns and demonstrate effective coping strategies Completed        POSTPARTUM    • Long Term Goal:Experiences normal postpartum course Complet

## 2017-03-16 NOTE — PLAN OF CARE
ANXIETY    • Will report anxiety at manageable levels Progressing        COPING    • Pt/Family able to verbalize concerns and demonstrate effective coping strategies Progressing        POSTPARTUM    • Long Term Goal:Experiences normal postpartum course Pro

## 2017-03-18 ENCOUNTER — TELEPHONE (OUTPATIENT)
Dept: OBGYN UNIT | Facility: HOSPITAL | Age: 42
End: 2017-03-18

## 2017-03-19 ENCOUNTER — TELEPHONE (OUTPATIENT)
Dept: OBGYN UNIT | Facility: HOSPITAL | Age: 42
End: 2017-03-19

## 2017-03-21 PROBLEM — O09.819 PREGNANCY RESULTING FROM IN VITRO FERTILIZATION (HCC): Status: RESOLVED | Noted: 2017-01-13 | Resolved: 2017-03-14

## 2017-03-21 PROBLEM — O09.819 PREGNANCY RESULTING FROM IN VITRO FERTILIZATION: Status: RESOLVED | Noted: 2017-01-13 | Resolved: 2017-03-14

## 2017-05-03 ENCOUNTER — NURSE ONLY (OUTPATIENT)
Dept: LACTATION | Facility: HOSPITAL | Age: 42
End: 2017-05-03
Attending: OBSTETRICS & GYNECOLOGY
Payer: COMMERCIAL

## 2017-05-03 VITALS — TEMPERATURE: 98 F

## 2017-05-03 DIAGNOSIS — O92.29 POSTPARTUM NIPPLE PAIN: ICD-10-CM

## 2017-05-03 DIAGNOSIS — N64.3 EXCESSIVE FLOW OF BREAST MILK: Primary | ICD-10-CM

## 2017-05-03 DIAGNOSIS — O92.79 POOR LATCH ON, POSTPARTUM: ICD-10-CM

## 2017-05-03 PROCEDURE — 99213 OFFICE O/P EST LOW 20 MIN: CPT

## 2017-05-03 NOTE — PATIENT INSTRUCTIONS
Offer breastfeeds with the nipple shield 2-4 times per day. Follow up with 2oz + after feedings based on hunger cues.   Reduce amount of supplement if Yuli  is latching well with 15-30 min of active swallows and seems content after feeds or less than 2oz different size flange, contact your nurse or the lactation department. • Maximum comfort suction is recommended. Begin with suction low then increase the suction to the highest suction that is comfortable for you.  Remember pumping should never be painful [email or web address]. html    Include night feedings and/or pumping sessions. Your hormone levels are higher at night. Increasing milk flow to baby if breastfeeding:   Improve your baby’s position and latch.   Positio should be deep into breast, with some room between nose and the breast.   · If needed, gently draw chin down lower to deepen latch. Nipple shield use if nipple(s) too sore to latch without shield.    · Use nipple shield (Medela  20mm)   · Check for swall one breast, this pumped milk can be stored for future use. If not nursing on either breast, feed baby your breast milk until able to return to breast.   · Express drops of breast milk on nipples before and after nursing (unless nipple thrush is present). important. • Do not use a different nipple shield. Before feeding your baby:  • Apply warm, moist heat to your breasts for a few minutes to increase milk flow.   • Rinse the shield in warm water to make it          softer and easier to adhere to the b 4-8 ounces per week (one-half to one ounce per day). Use the breastfeeding journal to record your baby’s feedings and diapers. Is a supplement needed?   If your baby does not latch on, or swallows less than 15-30 minutes at a feeding – swallowing after m

## 2017-05-25 ENCOUNTER — NURSE ONLY (OUTPATIENT)
Dept: LACTATION | Facility: HOSPITAL | Age: 42
End: 2017-05-25
Attending: OBSTETRICS & GYNECOLOGY
Payer: COMMERCIAL

## 2017-05-25 DIAGNOSIS — O92.79 POOR LATCH ON, POSTPARTUM: Primary | ICD-10-CM

## 2017-05-25 DIAGNOSIS — Z91.89 AT RISK FOR BREASTFEEDING DIFFICULTY: ICD-10-CM

## 2017-05-25 PROCEDURE — 99212 OFFICE O/P EST SF 10 MIN: CPT

## 2017-05-25 NOTE — PATIENT INSTRUCTIONS
Return to Work Schedule    Breastfeed or pump at 530am  Pump at 730  Pump at 1030am  Pump [id number]  Pump at 4pm  Breastfeed or pump at 7pm  Breastfeed or pump at 10/11pm

## 2017-09-06 PROCEDURE — 87591 N.GONORRHOEAE DNA AMP PROB: CPT | Performed by: OBSTETRICS & GYNECOLOGY

## 2017-09-06 PROCEDURE — 87624 HPV HI-RISK TYP POOLED RSLT: CPT | Performed by: OBSTETRICS & GYNECOLOGY

## 2017-09-06 PROCEDURE — 88175 CYTOPATH C/V AUTO FLUID REDO: CPT | Performed by: OBSTETRICS & GYNECOLOGY

## 2017-09-06 PROCEDURE — 87491 CHLMYD TRACH DNA AMP PROBE: CPT | Performed by: OBSTETRICS & GYNECOLOGY

## 2017-12-28 ENCOUNTER — HOSPITAL ENCOUNTER (OUTPATIENT)
Dept: MAMMOGRAPHY | Age: 42
Discharge: HOME | End: 2017-12-28
Attending: OBSTETRICS & GYNECOLOGY
Payer: COMMERCIAL

## 2017-12-28 DIAGNOSIS — Z12.39 SPECIAL SCREENING EXAMINATION FOR NEOPLASM OF BREAST: ICD-10-CM

## 2017-12-28 PROCEDURE — 77067 SCR MAMMO BI INCL CAD: CPT | Performed by: OBSTETRICS & GYNECOLOGY

## 2017-12-28 PROCEDURE — 77063 BREAST TOMOSYNTHESIS BI: CPT | Performed by: OBSTETRICS & GYNECOLOGY

## 2018-01-03 ENCOUNTER — HOSPITAL ENCOUNTER (OUTPATIENT)
Dept: MAMMOGRAPHY | Facility: HOSPITAL | Age: 43
Discharge: HOME | End: 2018-01-03
Attending: OBSTETRICS & GYNECOLOGY
Payer: COMMERCIAL

## 2018-01-03 DIAGNOSIS — R92.2 INCONCLUSIVE MAMMOGRAM: ICD-10-CM

## 2018-01-03 PROCEDURE — 77061 BREAST TOMOSYNTHESIS UNI: CPT | Performed by: OBSTETRICS & GYNECOLOGY

## 2018-01-03 PROCEDURE — 76642 ULTRASOUND BREAST LIMITED: CPT | Performed by: OBSTETRICS & GYNECOLOGY

## 2018-01-03 PROCEDURE — 77065 DX MAMMO INCL CAD UNI: CPT | Performed by: OBSTETRICS & GYNECOLOGY

## 2018-01-03 NOTE — IMAGING NOTE
Asssisted Dr. Addie Bauer with recommendation for a right US breast biopsy for abnormal mammogram result. Emotional and educational support provided. Written information provided to Mikey Leavitt.  Our breast center schedulers will call pt within 72 hours to

## 2018-01-05 ENCOUNTER — HOSPITAL ENCOUNTER (OUTPATIENT)
Dept: MAMMOGRAPHY | Facility: HOSPITAL | Age: 43
Discharge: HOME | End: 2018-01-05
Attending: OBSTETRICS & GYNECOLOGY
Payer: COMMERCIAL

## 2018-01-05 DIAGNOSIS — R92.2 INCONCLUSIVE MAMMOGRAM: ICD-10-CM

## 2018-01-05 PROCEDURE — 19081 BX BREAST 1ST LESION STRTCTC: CPT | Performed by: OBSTETRICS & GYNECOLOGY

## 2018-01-05 PROCEDURE — 88305 TISSUE EXAM BY PATHOLOGIST: CPT | Performed by: OBSTETRICS & GYNECOLOGY

## 2018-01-05 NOTE — PROGRESS NOTES
MD reviewed verbally concurs with radiology recommendation for stereotactic bx. Aware patient has been advised and agreed  to schedule appt.

## 2018-01-08 ENCOUNTER — TELEPHONE (OUTPATIENT)
Dept: MAMMOGRAPHY | Facility: HOSPITAL | Age: 43
End: 2018-01-08

## 2018-01-08 NOTE — TELEPHONE ENCOUNTER
Telephoned Henry Limb and name,  verified with pt. Notified Henry Limb of benign right breast stereotactic biopsy result. Henry Limb reports biopsy site is healing well. Hematoma management discussed.  Radiologist recommends next mammo

## 2018-03-21 PROCEDURE — 85025 COMPLETE CBC W/AUTO DIFF WBC: CPT | Performed by: OBSTETRICS & GYNECOLOGY

## 2018-03-21 PROCEDURE — 87086 URINE CULTURE/COLONY COUNT: CPT | Performed by: OBSTETRICS & GYNECOLOGY

## 2018-03-21 PROCEDURE — 86762 RUBELLA ANTIBODY: CPT | Performed by: OBSTETRICS & GYNECOLOGY

## 2018-03-21 PROCEDURE — 87491 CHLMYD TRACH DNA AMP PROBE: CPT | Performed by: OBSTETRICS & GYNECOLOGY

## 2018-03-21 PROCEDURE — 86900 BLOOD TYPING SEROLOGIC ABO: CPT | Performed by: OBSTETRICS & GYNECOLOGY

## 2018-03-21 PROCEDURE — 87340 HEPATITIS B SURFACE AG IA: CPT | Performed by: OBSTETRICS & GYNECOLOGY

## 2018-03-21 PROCEDURE — 87591 N.GONORRHOEAE DNA AMP PROB: CPT | Performed by: OBSTETRICS & GYNECOLOGY

## 2018-03-21 PROCEDURE — 86780 TREPONEMA PALLIDUM: CPT | Performed by: OBSTETRICS & GYNECOLOGY

## 2018-03-21 PROCEDURE — 86901 BLOOD TYPING SEROLOGIC RH(D): CPT | Performed by: OBSTETRICS & GYNECOLOGY

## 2018-03-21 PROCEDURE — 87389 HIV-1 AG W/HIV-1&-2 AB AG IA: CPT | Performed by: OBSTETRICS & GYNECOLOGY

## 2018-03-21 PROCEDURE — 86850 RBC ANTIBODY SCREEN: CPT | Performed by: OBSTETRICS & GYNECOLOGY

## 2018-07-10 PROBLEM — O30.049 DICHORIONIC DIAMNIOTIC TWIN PREGNANCY: Status: ACTIVE | Noted: 2018-07-10

## 2018-07-10 PROBLEM — O30.049 DICHORIONIC DIAMNIOTIC TWIN PREGNANCY (HCC): Status: ACTIVE | Noted: 2018-07-10

## 2018-07-16 ENCOUNTER — HOSPITAL ENCOUNTER (OUTPATIENT)
Facility: HOSPITAL | Age: 43
Setting detail: OBSERVATION
Discharge: HOME | End: 2018-07-16
Attending: OBSTETRICS & GYNECOLOGY | Admitting: OBSTETRICS & GYNECOLOGY
Payer: COMMERCIAL

## 2018-07-16 VITALS
TEMPERATURE: 98 F | BODY MASS INDEX: 34.21 KG/M2 | RESPIRATION RATE: 18 BRPM | WEIGHT: 231 LBS | HEART RATE: 109 BPM | DIASTOLIC BLOOD PRESSURE: 64 MMHG | HEIGHT: 69 IN | SYSTOLIC BLOOD PRESSURE: 123 MMHG

## 2018-07-16 LAB
BILIRUBIN URINE: NEGATIVE
BLOOD URINE: NEGATIVE
CONTROL RUN WITHIN 24 HOURS?: YES
GLUCOSE URINE: 250
KETONE URINE: NEGATIVE
NITRITE URINE: NEGATIVE
PH URINE: 6.5 (ref 5–8)
PROTEIN URINE: NEGATIVE
SPEC GRAVITY: 1.01 (ref 1–1.03)
URINE CLARITY: CLEAR
URINE COLOR: YELLOW
UROBILINOGEN URINE: 0.2

## 2018-07-16 PROCEDURE — 81002 URINALYSIS NONAUTO W/O SCOPE: CPT

## 2018-07-17 NOTE — NST
Nonstress Test   Patient: Whitney Melendrez    Gestation: 25w6d    NST:       Variability: Moderate    Variability - Fetus B: Moderate      Accelerations: Yes    Accelerations -  Fetus B: Yes      Decelerations: Variable     Decelerations - Fetus B: Variabl

## 2018-07-17 NOTE — PROGRESS NOTES
D/C instructions given to pt and discussed, pt states no questions at this time. Pt verb understanding and agreeable to POC. pt refused wheelchair off unit, pt escorted out by this RN with instructions in hand.

## 2018-07-17 NOTE — PROGRESS NOTES
Pt  EDC 10/23/18 twin pregnancy resents to L&D with c/o constant sharp severe right sided pain that radiates to umbilicus that began at apx 1730. Pt states called MD, took tylenol with some relief but has not gone away.  Pt denies uterine cramping, leak

## 2018-07-25 PROCEDURE — 82950 GLUCOSE TEST: CPT | Performed by: OBSTETRICS & GYNECOLOGY

## 2018-07-25 PROCEDURE — 86780 TREPONEMA PALLIDUM: CPT | Performed by: OBSTETRICS & GYNECOLOGY

## 2018-07-25 PROCEDURE — 87389 HIV-1 AG W/HIV-1&-2 AB AG IA: CPT | Performed by: OBSTETRICS & GYNECOLOGY

## 2018-07-26 PROBLEM — O99.019 ANEMIA IN PREGNANCY (HCC): Status: ACTIVE | Noted: 2018-07-26

## 2018-07-26 PROBLEM — O99.019 ANEMIA IN PREGNANCY: Status: ACTIVE | Noted: 2018-07-26

## 2018-08-11 ENCOUNTER — SURGERY (OUTPATIENT)
Age: 43
End: 2018-08-11

## 2018-08-11 ENCOUNTER — HOSPITAL ENCOUNTER (INPATIENT)
Facility: HOSPITAL | Age: 43
LOS: 3 days | Discharge: HOME | End: 2018-08-14
Attending: OBSTETRICS & GYNECOLOGY | Admitting: OBSTETRICS & GYNECOLOGY
Payer: COMMERCIAL

## 2018-08-11 ENCOUNTER — ANESTHESIA (OUTPATIENT)
Dept: OBGYN UNIT | Facility: HOSPITAL | Age: 43
End: 2018-08-11

## 2018-08-11 ENCOUNTER — APPOINTMENT (OUTPATIENT)
Dept: GENERAL RADIOLOGY | Facility: HOSPITAL | Age: 43
End: 2018-08-11
Attending: OBSTETRICS & GYNECOLOGY
Payer: COMMERCIAL

## 2018-08-11 ENCOUNTER — ANESTHESIA EVENT (OUTPATIENT)
Dept: OBGYN UNIT | Facility: HOSPITAL | Age: 43
End: 2018-08-11

## 2018-08-11 PROBLEM — O30.009 TWIN PREGNANCY (HCC): Status: ACTIVE | Noted: 2018-08-11

## 2018-08-11 PROBLEM — O30.009 TWIN PREGNANCY: Status: ACTIVE | Noted: 2018-08-11

## 2018-08-11 LAB
ALBUMIN SERPL-MCNC: 2.3 G/DL (ref 3.5–4.8)
ALBUMIN/GLOB SERPL: 0.7 {RATIO} (ref 1–2)
ALP LIVER SERPL-CCNC: 120 U/L (ref 37–98)
ALT SERPL-CCNC: 19 U/L (ref 14–54)
ANION GAP SERPL CALC-SCNC: 8 MMOL/L (ref 0–18)
ANTIBODY SCREEN: NEGATIVE
AST SERPL-CCNC: 19 U/L (ref 15–41)
BASOPHILS # BLD AUTO: 0.04 X10(3) UL (ref 0–0.1)
BASOPHILS NFR BLD AUTO: 0.3 %
BILIRUB SERPL-MCNC: 0.2 MG/DL (ref 0.1–2)
BILIRUBIN URINE: NEGATIVE
BUN BLD-MCNC: 8 MG/DL (ref 8–20)
BUN/CREAT SERPL: 12.5 (ref 10–20)
CALCIUM BLD-MCNC: 8.7 MG/DL (ref 8.3–10.3)
CHLORIDE SERPL-SCNC: 107 MMOL/L (ref 101–111)
CO2 SERPL-SCNC: 22 MMOL/L (ref 22–32)
CONTROL RUN WITHIN 24 HOURS?: YES
CREAT BLD-MCNC: 0.64 MG/DL (ref 0.55–1.02)
EOSINOPHIL # BLD AUTO: 0.08 X10(3) UL (ref 0–0.3)
EOSINOPHIL NFR BLD AUTO: 0.6 %
ERYTHROCYTE [DISTWIDTH] IN BLOOD BY AUTOMATED COUNT: 14 % (ref 11.5–16)
FETAL FIBRINECTIN: POSITIVE
GLOBULIN PLAS-MCNC: 3.3 G/DL (ref 2.5–3.7)
GLUCOSE BLD-MCNC: 86 MG/DL (ref 70–99)
GLUCOSE URINE: NEGATIVE
HCT VFR BLD AUTO: 33 % (ref 34–50)
HGB BLD-MCNC: 11.4 G/DL (ref 12–16)
IMMATURE GRANULOCYTE COUNT: 0.11 X10(3) UL (ref 0–1)
IMMATURE GRANULOCYTE RATIO %: 0.9 %
KETONE URINE: NEGATIVE
LYMPHOCYTES # BLD AUTO: 1.93 X10(3) UL (ref 0.9–4)
LYMPHOCYTES NFR BLD AUTO: 15.3 %
M PROTEIN MFR SERPL ELPH: 5.6 G/DL (ref 6.1–8.3)
MCH RBC QN AUTO: 31.8 PG (ref 27–33.2)
MCHC RBC AUTO-ENTMCNC: 34.5 G/DL (ref 31–37)
MCV RBC AUTO: 91.9 FL (ref 81–100)
MONOCYTES # BLD AUTO: 1.11 X10(3) UL (ref 0.1–1)
MONOCYTES NFR BLD AUTO: 8.8 %
NEUTROPHIL ABS PRELIM: 9.37 X10 (3) UL (ref 1.3–6.7)
NEUTROPHILS # BLD AUTO: 9.37 X10(3) UL (ref 1.3–6.7)
NEUTROPHILS NFR BLD AUTO: 74.1 %
NITRITE URINE: NEGATIVE
OSMOLALITY SERPL CALC.SUM OF ELEC: 282 MOSM/KG (ref 275–295)
PH URINE: 6.5 (ref 5–8)
PLATELET # BLD AUTO: 323 10(3)UL (ref 150–450)
POTASSIUM SERPL-SCNC: 3.9 MMOL/L (ref 3.6–5.1)
PROTEIN URINE: NEGATIVE
RBC # BLD AUTO: 3.59 X10(6)UL (ref 3.8–5.1)
RED CELL DISTRIBUTION WIDTH-SD: 44.6 FL (ref 35.1–46.3)
RH BLOOD TYPE: POSITIVE
SODIUM SERPL-SCNC: 137 MMOL/L (ref 136–144)
SPEC GRAVITY: 1 (ref 1–1.03)
T PALLIDUM AB SER QL IA: NONREACTIVE
URATE SERPL-MCNC: 3.9 MG/DL (ref 2.4–8)
URINE CLARITY: CLEAR
URINE COLOR: YELLOW
UROBILINOGEN URINE: 0.2
WBC # BLD AUTO: 12.6 X10(3) UL (ref 4–13)

## 2018-08-11 PROCEDURE — 85025 COMPLETE CBC W/AUTO DIFF WBC: CPT | Performed by: OBSTETRICS & GYNECOLOGY

## 2018-08-11 PROCEDURE — 81002 URINALYSIS NONAUTO W/O SCOPE: CPT

## 2018-08-11 PROCEDURE — 86850 RBC ANTIBODY SCREEN: CPT | Performed by: OBSTETRICS & GYNECOLOGY

## 2018-08-11 PROCEDURE — 88307 TISSUE EXAM BY PATHOLOGIST: CPT | Performed by: OBSTETRICS & GYNECOLOGY

## 2018-08-11 PROCEDURE — 80053 COMPREHEN METABOLIC PANEL: CPT | Performed by: OBSTETRICS & GYNECOLOGY

## 2018-08-11 PROCEDURE — 86780 TREPONEMA PALLIDUM: CPT | Performed by: OBSTETRICS & GYNECOLOGY

## 2018-08-11 PROCEDURE — 82731 ASSAY OF FETAL FIBRONECTIN: CPT | Performed by: OBSTETRICS & GYNECOLOGY

## 2018-08-11 PROCEDURE — 86901 BLOOD TYPING SEROLOGIC RH(D): CPT | Performed by: OBSTETRICS & GYNECOLOGY

## 2018-08-11 PROCEDURE — 84550 ASSAY OF BLOOD/URIC ACID: CPT | Performed by: OBSTETRICS & GYNECOLOGY

## 2018-08-11 PROCEDURE — 74018 RADEX ABDOMEN 1 VIEW: CPT | Performed by: OBSTETRICS & GYNECOLOGY

## 2018-08-11 PROCEDURE — 94667 MNPJ CHEST WALL 1ST: CPT

## 2018-08-11 PROCEDURE — 86900 BLOOD TYPING SEROLOGIC ABO: CPT | Performed by: OBSTETRICS & GYNECOLOGY

## 2018-08-11 RX ORDER — NALBUPHINE HCL 10 MG/ML
2.5 AMPUL (ML) INJECTION EVERY 4 HOURS PRN
Status: DISCONTINUED | OUTPATIENT
Start: 2018-08-11 | End: 2018-08-14

## 2018-08-11 RX ORDER — NALOXONE HYDROCHLORIDE 0.4 MG/ML
0.08 INJECTION, SOLUTION INTRAMUSCULAR; INTRAVENOUS; SUBCUTANEOUS
Status: DISCONTINUED | OUTPATIENT
Start: 2018-08-11 | End: 2018-08-14

## 2018-08-11 RX ORDER — KETOROLAC TROMETHAMINE 30 MG/ML
30 INJECTION, SOLUTION INTRAMUSCULAR; INTRAVENOUS EVERY 6 HOURS PRN
Status: DISPENSED | OUTPATIENT
Start: 2018-08-11 | End: 2018-08-12

## 2018-08-11 RX ORDER — MORPHINE SULFATE 4 MG/ML
2 INJECTION, SOLUTION INTRAMUSCULAR; INTRAVENOUS EVERY 5 MIN PRN
Status: DISCONTINUED | OUTPATIENT
Start: 2018-08-11 | End: 2018-08-11 | Stop reason: HOSPADM

## 2018-08-11 RX ORDER — DIPHENHYDRAMINE HYDROCHLORIDE 50 MG/ML
12.5 INJECTION INTRAMUSCULAR; INTRAVENOUS EVERY 4 HOURS PRN
Status: DISCONTINUED | OUTPATIENT
Start: 2018-08-11 | End: 2018-08-14

## 2018-08-11 RX ORDER — MORPHINE SULFATE 4 MG/ML
2 INJECTION, SOLUTION INTRAMUSCULAR; INTRAVENOUS EVERY 30 MIN PRN
Status: DISCONTINUED | OUTPATIENT
Start: 2018-08-11 | End: 2018-08-14

## 2018-08-11 RX ORDER — CALCIUM GLUCONATE 94 MG/ML
1 INJECTION, SOLUTION INTRAVENOUS ONCE AS NEEDED
Status: DISCONTINUED | OUTPATIENT
Start: 2018-08-11 | End: 2018-08-11

## 2018-08-11 RX ORDER — SIMETHICONE 80 MG
80 TABLET,CHEWABLE ORAL 3 TIMES DAILY PRN
Status: DISCONTINUED | OUTPATIENT
Start: 2018-08-11 | End: 2018-08-14

## 2018-08-11 RX ORDER — SODIUM CHLORIDE, SODIUM LACTATE, POTASSIUM CHLORIDE, CALCIUM CHLORIDE 600; 310; 30; 20 MG/100ML; MG/100ML; MG/100ML; MG/100ML
INJECTION, SOLUTION INTRAVENOUS CONTINUOUS
Status: DISCONTINUED | OUTPATIENT
Start: 2018-08-11 | End: 2018-08-11

## 2018-08-11 RX ORDER — TRISODIUM CITRATE DIHYDRATE AND CITRIC ACID MONOHYDRATE 500; 334 MG/5ML; MG/5ML
SOLUTION ORAL
Status: DISPENSED
Start: 2018-08-11 | End: 2018-08-12

## 2018-08-11 RX ORDER — BETAMETHASONE SODIUM PHOSPHATE AND BETAMETHASONE ACETATE 3; 3 MG/ML; MG/ML
12 INJECTION, SUSPENSION INTRA-ARTICULAR; INTRALESIONAL; INTRAMUSCULAR; SOFT TISSUE EVERY 24 HOURS
Status: DISCONTINUED | OUTPATIENT
Start: 2018-08-11 | End: 2018-08-11

## 2018-08-11 RX ORDER — CEFAZOLIN SODIUM/WATER 2 G/20 ML
2 SYRINGE (ML) INTRAVENOUS
Status: COMPLETED | OUTPATIENT
Start: 2018-08-11 | End: 2018-08-11

## 2018-08-11 RX ORDER — CHOLECALCIFEROL (VITAMIN D3) 25 MCG
1 TABLET,CHEWABLE ORAL DAILY
Status: DISCONTINUED | OUTPATIENT
Start: 2018-08-11 | End: 2018-08-14

## 2018-08-11 RX ORDER — ONDANSETRON 2 MG/ML
4 INJECTION INTRAMUSCULAR; INTRAVENOUS EVERY 6 HOURS PRN
Status: DISCONTINUED | OUTPATIENT
Start: 2018-08-11 | End: 2018-08-14

## 2018-08-11 RX ORDER — TERBUTALINE SULFATE 1 MG/ML
0.25 INJECTION, SOLUTION SUBCUTANEOUS
Status: DISCONTINUED | OUTPATIENT
Start: 2018-08-11 | End: 2018-08-11

## 2018-08-11 RX ORDER — DIPHENHYDRAMINE HYDROCHLORIDE 50 MG/ML
25 INJECTION INTRAMUSCULAR; INTRAVENOUS ONCE AS NEEDED
Status: DISCONTINUED | OUTPATIENT
Start: 2018-08-11 | End: 2018-08-11 | Stop reason: HOSPADM

## 2018-08-11 RX ORDER — KETOROLAC TROMETHAMINE 30 MG/ML
30 INJECTION, SOLUTION INTRAMUSCULAR; INTRAVENOUS ONCE AS NEEDED
Status: COMPLETED | OUTPATIENT
Start: 2018-08-11 | End: 2018-08-11

## 2018-08-11 RX ORDER — BISACODYL 10 MG
10 SUPPOSITORY, RECTAL RECTAL
Status: DISCONTINUED | OUTPATIENT
Start: 2018-08-11 | End: 2018-08-14

## 2018-08-11 RX ORDER — HYDROCODONE BITARTRATE AND ACETAMINOPHEN 5; 325 MG/1; MG/1
1 TABLET ORAL EVERY 4 HOURS PRN
Status: DISCONTINUED | OUTPATIENT
Start: 2018-08-11 | End: 2018-08-14

## 2018-08-11 RX ORDER — MORPHINE SULFATE 4 MG/ML
INJECTION, SOLUTION INTRAMUSCULAR; INTRAVENOUS
Status: COMPLETED
Start: 2018-08-11 | End: 2018-08-11

## 2018-08-11 RX ORDER — IBUPROFEN 600 MG/1
600 TABLET ORAL EVERY 6 HOURS
Status: DISCONTINUED | OUTPATIENT
Start: 2018-08-11 | End: 2018-08-14

## 2018-08-11 RX ORDER — ENOXAPARIN SODIUM 100 MG/ML
40 INJECTION SUBCUTANEOUS DAILY
Status: CANCELLED | OUTPATIENT
Start: 2018-08-11

## 2018-08-11 RX ORDER — CEFAZOLIN SODIUM/WATER 2 G/20 ML
SYRINGE (ML) INTRAVENOUS
Status: COMPLETED
Start: 2018-08-11 | End: 2018-08-11

## 2018-08-11 RX ORDER — BUPROPION HYDROCHLORIDE 100 MG/1
200 TABLET, EXTENDED RELEASE ORAL 2 TIMES DAILY
Status: DISCONTINUED | OUTPATIENT
Start: 2018-08-12 | End: 2018-08-14

## 2018-08-11 RX ORDER — DEXTROSE, SODIUM CHLORIDE, SODIUM LACTATE, POTASSIUM CHLORIDE, AND CALCIUM CHLORIDE 5; .6; .31; .03; .02 G/100ML; G/100ML; G/100ML; G/100ML; G/100ML
INJECTION, SOLUTION INTRAVENOUS CONTINUOUS
Status: DISCONTINUED | OUTPATIENT
Start: 2018-08-11 | End: 2018-08-14

## 2018-08-11 RX ORDER — NALBUPHINE HCL 10 MG/ML
2.5 AMPUL (ML) INJECTION
Status: DISCONTINUED | OUTPATIENT
Start: 2018-08-11 | End: 2018-08-11 | Stop reason: HOSPADM

## 2018-08-11 RX ORDER — DOCUSATE SODIUM 100 MG/1
100 CAPSULE, LIQUID FILLED ORAL
Status: DISCONTINUED | OUTPATIENT
Start: 2018-08-12 | End: 2018-08-12

## 2018-08-11 RX ORDER — TERBUTALINE SULFATE 1 MG/ML
INJECTION, SOLUTION SUBCUTANEOUS
Status: COMPLETED
Start: 2018-08-11 | End: 2018-08-11

## 2018-08-11 RX ORDER — CETIRIZINE HYDROCHLORIDE 10 MG/1
10 TABLET ORAL DAILY
Status: DISCONTINUED | OUTPATIENT
Start: 2018-08-12 | End: 2018-08-14

## 2018-08-11 RX ORDER — BETAMETHASONE SODIUM PHOSPHATE AND BETAMETHASONE ACETATE 3; 3 MG/ML; MG/ML
INJECTION, SUSPENSION INTRA-ARTICULAR; INTRALESIONAL; INTRAMUSCULAR; SOFT TISSUE
Status: COMPLETED
Start: 2018-08-11 | End: 2018-08-11

## 2018-08-11 RX ORDER — ONDANSETRON 2 MG/ML
4 INJECTION INTRAMUSCULAR; INTRAVENOUS ONCE AS NEEDED
Status: DISCONTINUED | OUTPATIENT
Start: 2018-08-11 | End: 2018-08-11 | Stop reason: HOSPADM

## 2018-08-11 RX ORDER — BUPROPION HYDROCHLORIDE 150 MG/1
450 TABLET, EXTENDED RELEASE ORAL DAILY
Status: DISCONTINUED | OUTPATIENT
Start: 2018-08-12 | End: 2018-08-11

## 2018-08-11 RX ORDER — DESVENLAFAXINE 100 MG/1
100 TABLET, EXTENDED RELEASE ORAL DAILY
Status: DISCONTINUED | OUTPATIENT
Start: 2018-08-12 | End: 2018-08-14

## 2018-08-11 RX ORDER — HYDROCODONE BITARTRATE AND ACETAMINOPHEN 10; 325 MG/1; MG/1
1 TABLET ORAL EVERY 4 HOURS PRN
Status: DISCONTINUED | OUTPATIENT
Start: 2018-08-11 | End: 2018-08-14

## 2018-08-11 RX ORDER — ZOLPIDEM TARTRATE 5 MG/1
5 TABLET ORAL NIGHTLY PRN
Status: DISCONTINUED | OUTPATIENT
Start: 2018-08-11 | End: 2018-08-14

## 2018-08-11 NOTE — H&P
[address] [facility] Patient Status:  Inpatient    1975 MRN [id number]   Location [address] [facility] Attending Chase Garcia MD   Hosp Day # 0 PCP Renetta Benitez MD     Subjective:     55 yea

## 2018-08-11 NOTE — PROGRESS NOTES
Pt transferred to Mother Baby room 4061 in stable condition. Report given to [facility]. Both RNs in room at transfer, PCA pump verified. Pt alert & awake.

## 2018-08-11 NOTE — ANESTHESIA PREPROCEDURE EVALUATION
PRE-OP EVALUATION    Patient Name: Ke Cortés    Pre-op Diagnosis: 29.4wk, twin IUP, PTL, bradycardia    Procedure(s):      Surgeon(s) and Role:     Janelle Andersen MD - Primary     * Sarahi Villegas MD - Assisting Surgeon    Pre-op vitals reviewed. mouth. Disp:  Rfl:        Allergies: Patient has no known allergies. Anesthesia Evaluation    Patient summary reviewed. Anesthetic Complications  (-) history of anesthetic complications         GI/Hepatic/Renal    Negative GI/hepatic/renal ROS. FB  TM distance: 4 - 6 cm  Neck ROM: full Cardiovascular    Cardiovascular exam normal.  Rhythm: regular  Rate: normal  (-) murmur   Dental    No notable dental history.          Pulmonary    Pulmonary exam normal.  Breath sounds clear to auscultation bilat

## 2018-08-11 NOTE — ANESTHESIA POSTPROCEDURE EVALUATION
[address] [facility] Patient Status:  Inpatient   Age/Gender 43year old female MRN [id number]   Location [address] [facility] Attending Mason Archuleta MD   [address] Day # 0 PCP Heather Butler MD       Anesthesia Post-op Note

## 2018-08-11 NOTE — CONSULTS
[facility] Pharmacy Note:  Pain Consult    Kallie Del Rosario is a 43year old female started on Morphine PCA by Dr. Javier Crow. Pharmacy was consulted to review medication profile and to discontinue previously ordered narcotics and sedatives.     Medication profile

## 2018-08-11 NOTE — PROGRESS NOTES
Pt brought into triage with complaints of abdominal pain since 0830 today and that she is pregnant with twins. Pt appears to be uncomfortable and grimacing with intermittent pain. Denies any vaginal bleeding or discharge.

## 2018-08-11 NOTE — OPERATIVE REPORT
[facility]    PATIENT'S NAME: Elder WILSON   ATTENDING PHYSICIAN: Linda Dallas M.D. OPERATING PHYSICIAN: Eduard Eaton M.D.    PATIENT ACCOUNT#:   [de-identified]    LOCATION:  [address] [place] [address]  MEDICAL RECORD #:   [id number]       DATE OF BIRTH: however, there was a lot of cord presenting when we were delivering the head. The cord was clamped and cut, baby was handed to waiting neonatologist.  The second baby was a breech/oblique. Arm tried to deliver through the incision.   At this point, I redu condition.     Dictated By Elizabeth Tyler M.D.  d: 08/11/2018 13:26:38  t: 08/11/2018 14:16:11  Job [id number]  T/

## 2018-08-11 NOTE — BRIEF OP NOTE
Pre-Operative Diagnosis: 29.4wk, twin IUP, PTL, bradycardia     Post-Operative Diagnosis: Same     Procedure Performed: Emergent Primary LTCS  Procedure(s):      Surgeon(s) and Role:     Benigno Montague MD - Primary     * Kiarra Beltran MD - Assisting Samara Garland

## 2018-08-11 NOTE — PROGRESS NOTES
Received in mother/baby in stable condition. Transferred to bed via hover mat. Bed to low position. Side rails up x 2. Call light in reach. Oriented to room.

## 2018-08-11 NOTE — PROGRESS NOTES
While on the phone with pt's MD, fhr of twin b appears to be decellerating, charge nurse near and called to start iv and attempt to get twin b back on efm. Pt extremely anxious regarding her contractions, sitting up and turning frequently.

## 2018-08-12 LAB
BASOPHILS # BLD AUTO: 0.01 X10(3) UL (ref 0–0.1)
BASOPHILS NFR BLD AUTO: 0.1 %
EOSINOPHIL # BLD AUTO: 0.03 X10(3) UL (ref 0–0.3)
EOSINOPHIL NFR BLD AUTO: 0.2 %
ERYTHROCYTE [DISTWIDTH] IN BLOOD BY AUTOMATED COUNT: 13.8 % (ref 11.5–16)
ERYTHROCYTE [DISTWIDTH] IN BLOOD BY AUTOMATED COUNT: 14.1 % (ref 11.5–16)
HCT VFR BLD AUTO: 22.7 % (ref 34–50)
HCT VFR BLD AUTO: 23.6 % (ref 34–50)
HGB BLD-MCNC: 7.5 G/DL (ref 12–16)
HGB BLD-MCNC: 8 G/DL (ref 12–16)
IMMATURE GRANULOCYTE COUNT: 0.11 X10(3) UL (ref 0–1)
IMMATURE GRANULOCYTE RATIO %: 0.8 %
LYMPHOCYTES # BLD AUTO: 2.58 X10(3) UL (ref 0.9–4)
LYMPHOCYTES NFR BLD AUTO: 18.4 %
MCH RBC QN AUTO: 31 PG (ref 27–33.2)
MCH RBC QN AUTO: 31.9 PG (ref 27–33.2)
MCHC RBC AUTO-ENTMCNC: 33 G/DL (ref 31–37)
MCHC RBC AUTO-ENTMCNC: 33.9 G/DL (ref 31–37)
MCV RBC AUTO: 93.8 FL (ref 81–100)
MCV RBC AUTO: 94 FL (ref 81–100)
MONOCYTES # BLD AUTO: 1.51 X10(3) UL (ref 0.1–1)
MONOCYTES NFR BLD AUTO: 10.8 %
NEUTROPHIL ABS PRELIM: 9.8 X10 (3) UL (ref 1.3–6.7)
NEUTROPHILS # BLD AUTO: 9.8 X10(3) UL (ref 1.3–6.7)
NEUTROPHILS NFR BLD AUTO: 69.7 %
PLATELET # BLD AUTO: 273 10(3)UL (ref 150–450)
PLATELET # BLD AUTO: 274 10(3)UL (ref 150–450)
RBC # BLD AUTO: 2.42 X10(6)UL (ref 3.8–5.1)
RBC # BLD AUTO: 2.51 X10(6)UL (ref 3.8–5.1)
RED CELL DISTRIBUTION WIDTH-SD: 45.5 FL (ref 35.1–46.3)
RED CELL DISTRIBUTION WIDTH-SD: 45.9 FL (ref 35.1–46.3)
WBC # BLD AUTO: 14 X10(3) UL (ref 4–13)
WBC # BLD AUTO: 17.1 X10(3) UL (ref 4–13)

## 2018-08-12 PROCEDURE — 85027 COMPLETE CBC AUTOMATED: CPT | Performed by: OBSTETRICS & GYNECOLOGY

## 2018-08-12 PROCEDURE — 85025 COMPLETE CBC W/AUTO DIFF WBC: CPT | Performed by: OBSTETRICS & GYNECOLOGY

## 2018-08-12 RX ORDER — DOCUSATE SODIUM 100 MG/1
100 CAPSULE, LIQUID FILLED ORAL 2 TIMES DAILY
Status: DISCONTINUED | OUTPATIENT
Start: 2018-08-12 | End: 2018-08-14

## 2018-08-12 RX ORDER — PANTOPRAZOLE SODIUM 40 MG/1
40 TABLET, DELAYED RELEASE ORAL NIGHTLY PRN
Status: DISCONTINUED | OUTPATIENT
Start: 2018-08-12 | End: 2018-08-14

## 2018-08-12 NOTE — PROGRESS NOTES
[facility]  Post-Partum Caesarean Section Progress Note    Ana Melendrez Patient Status:  Inpatient    1975 MRN [id number]   [facility] 2SW-J Attending Juventino Orosco MD   Hosp Day # 1 PCP Juanid Carlson MD     SUBJECTIVE: Howard  8/12/2018  8:09 AM

## 2018-08-12 NOTE — PROGRESS NOTES
Urine very concentrated, very small volume in bag and tubing. Pt reports being very thirsty. Assessment WNL. VSS. Dr. Rachel Navarrete notified of above. Orders received to give IV fluid bolus now.  Also clarified medications with MD. Dr. Rachel Navarrete wants Wellbutrin and Bonnie

## 2018-08-12 NOTE — PLAN OF CARE
Problem: POSTPARTUM  Goal: Optimize infant feeding at the breast  INTERVENTIONS: .  - Assess support systems available to mother/family.  - Identify cultural beliefs/practices regarding lactation, letdown techniques, maternal food preferences.   - Assess mo

## 2018-08-12 NOTE — PROGRESS NOTES
HR 70's-90's while sleeping  Urine output slowly increasing to 30-45cc/hr, less concentrated  Pt to NICU again @ 0300, tolerating well  Using IC and PEP  Using breastpump

## 2018-08-12 NOTE — PLAN OF CARE
Was dizzy earlier sitting at edge of bed, wanted to go to NICU but unable. Assisted up @ 2145, to NICU via wheelchair and tolerated well. 's while in bed, 130's when up to wheelchair.    O2sats %  Urine output concentrated and low, second nikolai

## 2018-08-12 NOTE — PROGRESS NOTES
Pain Service    Pt resting so did not disturb. Per RN, PCA was d/c'd this morning. Pt's pain is being managed with oral pain medications. No further recommendations. Will sign-off.     Lynsey You RN/Dr. Angelika Villegas

## 2018-08-13 NOTE — CM/SW NOTE
08/13/18 1200   CM/SW Referral Data   Referral Source Nurse;Family; Social Work (self-referral)   Reason for Referral Discharge planning;Psychoscial assessment   Informant Patient     SW completed an assessment with pt.    Chart reviewed and discussed wit

## 2018-08-13 NOTE — CM/SW NOTE
CM met with pt to review insurance and PCP for twins in NICU. The 29 wks twins will be added to Rita Mcmanus PPO and PCP for twins will be Dr Jenn Bhatia. Pt has breast pumps and does plan to provide breast milk to infants.  CM reviewed insurance and auth process and

## 2018-08-13 NOTE — PROGRESS NOTES
[facility]  Post-Partum Caesarean Section Progress Note    Ariel Melendrez Patient Status:  Inpatient    1975 MRN [id number]   [facility] 2SW-J Attending Gayle Mendosa MD   Hosp Day # 2 PCP Dante Gonzalez MD     SUBJECTIVE:

## 2018-08-13 NOTE — PLAN OF CARE
Problem: POSTPARTUM  Goal: Long Term Goal:Experiences normal postpartum course  INTERVENTIONS:  - Assess and monitor vital signs and lab values. - Assess fundus and lochia. - Provide ice/sitz baths for perineum discomfort.   - Monitor healing of incision/ as tolerated, if ordered  - Obtain nutritional consult as needed  - Evaluate fluid balance   Outcome: Progressing    Goal: Maintains or returns to baseline bowel function  INTERVENTIONS:  - Assess bowel function  - Maintain adequate hydration with IV or PO

## 2018-08-14 VITALS
TEMPERATURE: 98 F | RESPIRATION RATE: 18 BRPM | OXYGEN SATURATION: 95 % | SYSTOLIC BLOOD PRESSURE: 139 MMHG | DIASTOLIC BLOOD PRESSURE: 67 MMHG | HEART RATE: 87 BPM

## 2018-08-14 RX ORDER — HYDROCODONE BITARTRATE AND ACETAMINOPHEN 10; 325 MG/1; MG/1
1 TABLET ORAL EVERY 4 HOURS PRN
Qty: 30 TABLET | Refills: 0 | Status: SHIPPED | OUTPATIENT
Start: 2018-08-14 | End: 2018-09-24 | Stop reason: ALTCHOICE

## 2018-08-14 NOTE — PROGRESS NOTES
[facility]  Post-Partum Caesarean Section Progress Note    Helen Melendrez Patient Status:  Inpatient    1975 MRN [id number]   [facility] 2SW-J Attending Carlyle Flores MD   Hosp Day # 3 PCP Royal Rene MD     SUBJECTIVE:

## 2018-08-14 NOTE — DISCHARGE SUMMARY
[facility]  Discharge Summary    Clay Melendrez Patient Status:  Inpatient    1975 MRN [id number]   [facility] 2SW-J Attending Karma Marc MD   Hosp Day # 3 PCP Renetta Cabral MD     Primary OB Clinician: Kassie Brenner    EDC: Es

## 2018-08-14 NOTE — PROGRESS NOTES
REVIEWED D/C TEACHING WITH PT. VERBALIZES UNDERSTANDING. ALL QUESTIONS ANSWERED. PT STATES FEELS CONFIDENT CARING FOR SELF AT HOME. MOM DISCHARGED TO HOME IN STABLE CONDITION.  WILL FOLLOW-UP IN OFFICE AS DIRECTED WITH OB. TWINS REMAINING IN NICU D/T PREMAT

## 2018-08-15 PROBLEM — O30.049 DICHORIONIC DIAMNIOTIC TWIN PREGNANCY (HCC): Status: RESOLVED | Noted: 2018-07-10 | Resolved: 2018-08-11

## 2018-08-15 PROBLEM — O30.049 DICHORIONIC DIAMNIOTIC TWIN PREGNANCY: Status: RESOLVED | Noted: 2018-07-10 | Resolved: 2018-08-11

## 2018-08-17 ENCOUNTER — TELEPHONE (OUTPATIENT)
Dept: OBGYN UNIT | Facility: HOSPITAL | Age: 43
End: 2018-08-17

## 2018-08-17 NOTE — PROGRESS NOTES
Reviewed self care w / mom, she verbalizes understanding of instructions reviewed. Encourage to follow up w/ MDs as directed and w/ questions/concerns. Boys remain in nicu and mom visits daily, pumping well.  EPDS = 11 antenatally and 7 PP, denies ppd now, care reviewed

## 2018-09-24 PROCEDURE — 87086 URINE CULTURE/COLONY COUNT: CPT | Performed by: OBSTETRICS & GYNECOLOGY

## 2019-01-16 PROBLEM — G47.33 OSA (OBSTRUCTIVE SLEEP APNEA): Status: ACTIVE | Noted: 2019-01-16

## 2023-02-11 ENCOUNTER — APPOINTMENT (OUTPATIENT)
Dept: CT IMAGING | Age: 48
End: 2023-02-11
Attending: EMERGENCY MEDICINE
Payer: COMMERCIAL

## 2023-02-11 ENCOUNTER — HOSPITAL ENCOUNTER (OUTPATIENT)
Age: 48
Discharge: HOME | End: 2023-02-11
Attending: EMERGENCY MEDICINE
Payer: COMMERCIAL

## 2023-02-11 VITALS
OXYGEN SATURATION: 100 % | TEMPERATURE: 98 F | HEIGHT: 69 IN | BODY MASS INDEX: 28.14 KG/M2 | WEIGHT: 190 LBS | HEART RATE: 70 BPM | SYSTOLIC BLOOD PRESSURE: 114 MMHG | RESPIRATION RATE: 16 BRPM | DIASTOLIC BLOOD PRESSURE: 82 MMHG

## 2023-02-11 DIAGNOSIS — H10.9 BACTERIAL CONJUNCTIVITIS OF BOTH EYES: ICD-10-CM

## 2023-02-11 DIAGNOSIS — L03.213 PERIORBITAL CELLULITIS OF RIGHT EYE: Primary | ICD-10-CM

## 2023-02-11 DIAGNOSIS — B96.89 BACTERIAL CONJUNCTIVITIS OF BOTH EYES: ICD-10-CM

## 2023-02-11 LAB
#MXD IC: 0.7 X10ˆ3/UL (ref 0.1–1)
BUN BLD-MCNC: 16 MG/DL (ref 7–18)
CHLORIDE BLD-SCNC: 100 MMOL/L (ref 98–112)
CO2 BLD-SCNC: 25 MMOL/L (ref 21–32)
CREAT BLD-MCNC: 0.7 MG/DL
GFR SERPLBLD BASED ON 1.73 SQ M-ARVRAT: 107 ML/MIN/1.73M2 (ref 60–?)
GLUCOSE BLD-MCNC: 100 MG/DL (ref 70–99)
HCT VFR BLD AUTO: 43.6 %
HCT VFR BLD CALC: 44 %
HGB BLD-MCNC: 14.4 G/DL
ISTAT IONIZED CALCIUM FOR CHEM 8: 1.19 MMOL/L (ref 1.12–1.32)
LYMPHOCYTES # BLD AUTO: 1.8 X10ˆ3/UL (ref 1–4)
LYMPHOCYTES NFR BLD AUTO: 27.8 %
MCH RBC QN AUTO: 30.9 PG (ref 26–34)
MCHC RBC AUTO-ENTMCNC: 33 G/DL (ref 31–37)
MCV RBC AUTO: 93.6 FL (ref 80–100)
MIXED CELL %: 10.6 %
NEUTROPHILS # BLD AUTO: 4 X10ˆ3/UL (ref 1.5–7.7)
NEUTROPHILS NFR BLD AUTO: 61.6 %
PLATELET # BLD AUTO: 285 X10ˆ3/UL (ref 150–450)
POTASSIUM BLD-SCNC: 4.6 MMOL/L (ref 3.6–5.1)
RBC # BLD AUTO: 4.66 X10ˆ6/UL
SODIUM BLD-SCNC: 136 MMOL/L (ref 136–145)
WBC # BLD AUTO: 6.5 X10ˆ3/UL (ref 4–11)

## 2023-02-11 PROCEDURE — 99205 OFFICE O/P NEW HI 60 MIN: CPT

## 2023-02-11 PROCEDURE — 80047 BASIC METABLC PNL IONIZED CA: CPT

## 2023-02-11 PROCEDURE — S0077 INJECTION, CLINDAMYCIN PHOSP: HCPCS

## 2023-02-11 PROCEDURE — 70481 CT ORBIT/EAR/FOSSA W/DYE: CPT | Performed by: EMERGENCY MEDICINE

## 2023-02-11 PROCEDURE — 96365 THER/PROPH/DIAG IV INF INIT: CPT

## 2023-02-11 PROCEDURE — 99204 OFFICE O/P NEW MOD 45 MIN: CPT

## 2023-02-11 PROCEDURE — 85025 COMPLETE CBC W/AUTO DIFF WBC: CPT | Performed by: EMERGENCY MEDICINE

## 2023-02-11 RX ORDER — HYDROCODONE BITARTRATE AND ACETAMINOPHEN 5; 325 MG/1; MG/1
1 TABLET ORAL ONCE
Status: DISCONTINUED | OUTPATIENT
Start: 2023-02-11 | End: 2023-02-11

## 2023-02-11 RX ORDER — CLINDAMYCIN PHOSPHATE 600 MG/50ML
600 INJECTION INTRAVENOUS ONCE
Status: COMPLETED | OUTPATIENT
Start: 2023-02-11 | End: 2023-02-11

## 2023-02-11 RX ORDER — CLINDAMYCIN HYDROCHLORIDE 150 MG/1
300 CAPSULE ORAL 3 TIMES DAILY
Qty: 42 CAPSULE | Refills: 0 | Status: SHIPPED | OUTPATIENT
Start: 2023-02-12 | End: 2023-02-19

## 2023-02-11 RX ORDER — ERYTHROMYCIN 5 MG/G
1 OINTMENT OPHTHALMIC EVERY 6 HOURS
Qty: 3.5 G | Refills: 0 | Status: SHIPPED | OUTPATIENT
Start: 2023-02-11 | End: 2023-02-18

## 2023-02-11 NOTE — ED INITIAL ASSESSMENT (HPI)
Pt with R eye drainage since yesterday; today with R eye redness/swelling; R sided facial congestion    No fever

## 2023-10-31 ENCOUNTER — HOSPITAL ENCOUNTER (OUTPATIENT)
Age: 48
Discharge: HOME | End: 2023-10-31
Payer: COMMERCIAL

## 2023-10-31 VITALS
HEART RATE: 68 BPM | RESPIRATION RATE: 18 BRPM | TEMPERATURE: 98 F | SYSTOLIC BLOOD PRESSURE: 133 MMHG | HEIGHT: 69 IN | BODY MASS INDEX: 27.4 KG/M2 | WEIGHT: 185 LBS | OXYGEN SATURATION: 99 % | DIASTOLIC BLOOD PRESSURE: 86 MMHG

## 2023-10-31 DIAGNOSIS — B34.9 VIRAL SYNDROME: Primary | ICD-10-CM

## 2023-10-31 LAB
POCT INFLUENZA A: NEGATIVE
POCT INFLUENZA B: NEGATIVE
SARS-COV-2 RNA RESP QL NAA+PROBE: NOT DETECTED

## 2023-10-31 PROCEDURE — 87502 INFLUENZA DNA AMP PROBE: CPT | Performed by: PHYSICIAN ASSISTANT

## 2023-10-31 PROCEDURE — 99213 OFFICE O/P EST LOW 20 MIN: CPT

## 2023-10-31 PROCEDURE — 99212 OFFICE O/P EST SF 10 MIN: CPT

## 2023-10-31 NOTE — ED INITIAL ASSESSMENT (HPI)
C/o cough, post nasal drip and swollen glands for a week. A month ago treated with 10 days of antibiotic for sinus infection.

## 2024-02-24 ENCOUNTER — HOSPITAL ENCOUNTER (OUTPATIENT)
Age: 49
Discharge: HOME | End: 2024-02-24
Payer: COMMERCIAL

## 2024-02-24 ENCOUNTER — APPOINTMENT (OUTPATIENT)
Dept: GENERAL RADIOLOGY | Age: 49
End: 2024-02-24
Attending: NURSE PRACTITIONER
Payer: COMMERCIAL

## 2024-02-24 VITALS
DIASTOLIC BLOOD PRESSURE: 73 MMHG | OXYGEN SATURATION: 99 % | BODY MASS INDEX: 27.4 KG/M2 | RESPIRATION RATE: 18 BRPM | HEIGHT: 69 IN | HEART RATE: 88 BPM | SYSTOLIC BLOOD PRESSURE: 116 MMHG | TEMPERATURE: 97 F | WEIGHT: 185 LBS

## 2024-02-24 DIAGNOSIS — S16.1XXA STRAIN OF NECK MUSCLE, INITIAL ENCOUNTER: ICD-10-CM

## 2024-02-24 DIAGNOSIS — M25.562 ACUTE PAIN OF LEFT KNEE: ICD-10-CM

## 2024-02-24 DIAGNOSIS — H60.501 ACUTE OTITIS EXTERNA OF RIGHT EAR, UNSPECIFIED TYPE: Primary | ICD-10-CM

## 2024-02-24 PROCEDURE — 99213 OFFICE O/P EST LOW 20 MIN: CPT

## 2024-02-24 PROCEDURE — 73560 X-RAY EXAM OF KNEE 1 OR 2: CPT | Performed by: NURSE PRACTITIONER

## 2024-02-24 PROCEDURE — 99214 OFFICE O/P EST MOD 30 MIN: CPT

## 2024-02-24 RX ORDER — MELOXICAM 7.5 MG/1
7.5 TABLET ORAL DAILY
Qty: 10 TABLET | Refills: 0 | Status: SHIPPED | OUTPATIENT
Start: 2024-02-24 | End: 2024-03-05

## 2024-02-24 RX ORDER — AMOXICILLIN AND CLAVULANATE POTASSIUM 875; 125 MG/1; MG/1
1 TABLET, FILM COATED ORAL 2 TIMES DAILY
Qty: 20 TABLET | Refills: 0 | Status: SHIPPED | OUTPATIENT
Start: 2024-02-24 | End: 2024-03-05

## 2024-02-24 RX ORDER — CIPROFLOXACIN AND DEXAMETHASONE 3; 1 MG/ML; MG/ML
4 SUSPENSION/ DROPS AURICULAR (OTIC) 2 TIMES DAILY
Qty: 1 EACH | Refills: 0 | Status: SHIPPED | OUTPATIENT
Start: 2024-02-24 | End: 2024-03-02

## 2024-02-24 RX ORDER — CYCLOBENZAPRINE HCL 10 MG
10 TABLET ORAL 3 TIMES DAILY PRN
Qty: 20 TABLET | Refills: 0 | Status: SHIPPED | OUTPATIENT
Start: 2024-02-24 | End: 2024-03-02

## 2024-02-24 NOTE — ED INITIAL ASSESSMENT (HPI)
Pt c/o pain and drainage from R ear, pt also c/o neck pain straining during workout yesterday and L knee pain after lifting heavy weights

## 2024-02-24 NOTE — ED PROVIDER NOTES
Patient Seen in: [facility]      History     Chief Complaint   Patient presents with    Cold     Ear infection, cortisone shot, strained neck - Entered by patient     Stated Complaint: Cold - Ear infection, cortisone shot, strained neck    Subjective:   HPI  48-year-old female with anemia, anxiety, depression, and hypertension presents with multiple complaints.  She states she started having drainage and pain from her right ear the past couple of days.  She denies any recent congestion or cough.  She does sometimes wear ear buds.  She denies any fever or chills.  She is also stating that she was working out yesterday and started having bilateral trapezius neck pain and strain as well as left knee pain.  She lifts heavy weights and has had issues with her left knee previously and has known arthritis and has received cortisone shots.  She denies any calf pain.  She is able to bear weight.  She states the pain is worse with straightening her leg.    Objective:   Past Medical History:   Diagnosis Date    Anemia in pregnancy ([facility]) 07/26/2018    Anxiety state, unspecified     Depression     Hypertension affecting pregnancy ([facility]) 2017    Infertility, female     PONV (postoperative nausea and vomiting)               Past Surgical History:   Procedure Laterality Date    ADENOIDECTOMY  2003    tonsillectomy, adenoidectomy, uvuolopharyngopalatoplasty - for sleep apnea    ARTHROSCOPY OF JOINT UNLISTED      left knee    D&C AFTER DELIVERY      IVF PACKAGE  02/03/2018    twin pregnancy. date listed is embryo transfer date.    NEEDLE BIOPSY RIGHT  01/2018    Adenosis, Stromal Fibrosis    OTHER SURGICAL HISTORY  10/2019    abdominoplasty and lipo. Dr. Carrion [facility]    TONSILLECTOMY  age 20                Social History     Socioeconomic History    Marital status:    Tobacco Use    Smoking status: Every Day     Packs/day: 1.00     Years: 20.00     Additional pack years: 0.00     Total pack years: 20.00      Types: Cigarettes     Start date: 1/14/1994    Smokeless tobacco: Never    Tobacco comments:     did do wellbutrin in past   Vaping Use    Vaping Use: Never used   Substance and Sexual Activity    Alcohol use: Yes     Alcohol/week: 1.0 standard drink of alcohol     Types: 1 Glasses of wine per week     Comment: Daily     Drug use: No    Sexual activity: Yes     Partners: Male              Review of Systems   All other systems reviewed and are negative.      Positive for stated complaint: Cold - Ear infection, cortisone shot, strained neck  Other systems are as noted in HPI.  Constitutional and vital signs reviewed.      All other systems reviewed and negative except as noted above.    Physical Exam     ED Triage Vitals [02/24/24 1004]   /73   Pulse 88   Resp 18   Temp 97.4 °F (36.3 °C)   Temp src Temporal   SpO2 99 %   O2 Device None (Room air)       Current:/73   Pulse 88   Temp 97.4 °F (36.3 °C) (Temporal)   Resp 18   Ht 175.3 cm (5' 9\")   Wt 83.9 kg   LMP 07/15/2019   SpO2 99%   BMI 27.32 kg/m²         Physical Exam  Vitals and nursing note reviewed.   Constitutional:       General: She is not in acute distress.     Appearance: She is well-developed. She is not ill-appearing or toxic-appearing.   HENT:      Left Ear: Tympanic membrane, ear canal and external ear normal.      Ears:      Comments: Right canal swelling and erythema with yellowish drainage.  TM with erythema.     Nose: No congestion or rhinorrhea.   Neck:      Comments: Bilateral paracervical tenderness with tenderness over the trapezius.  Radial pulses 3+ bilaterally.  Strength and sensation intact upper extremities.  Cardiovascular:      Rate and Rhythm: Normal rate and regular rhythm.      Heart sounds: Normal heart sounds.   Pulmonary:      Effort: Pulmonary effort is normal.      Breath sounds: Normal breath sounds.   Musculoskeletal:      Cervical back: Normal range of motion.      Comments: Full range of motion of left knee  with medial tenderness.  Reported pain with straightening of the leg.  No calf tenderness.  Pulses intact.  Ambulatory without assistance.   Skin:     General: Skin is warm and dry.   Neurological:      Mental Status: She is alert and oriented to person, place, and time.           ED Course   Labs Reviewed - No data to display          XR KNEE (1 OR 2 VIEWS), LEFT (CPT=73560)    Result Date: 2/24/2024  PROCEDURE:  XR KNEE (1 OR 2 VIEWS), LEFT (CPT=73560)  COMPARISON:  None.  INDICATIONS:  left knee pain s/p lower leg work out, hx of arthritis and cortisone injections previously  PATIENT STATED HISTORY: (As transcribed by Technologist)  Left knee pain patella area after lifting weights.               CONCLUSION:  Moderate suprapatellar effusion.  Mild medial compartment narrowing left knee.  No acute fracture or dislocation.   LOCATION:  [facility]   Dictated by (CST): Kalpana Cantrell MD on 2/24/2024 at 11:29 AM     Finalized by (CST): Kalpana Cantrell MD on 2/24/2024 at 11:30 AM               MDM     Medical Decision Making  48-year-old female with anemia, anxiety, depression, and hypertension presents with multiple complaints.  She states she started having drainage and pain from her right ear the past couple of days.  She denies any recent congestion or cough.  She does sometimes wear ear buds.  She denies any fever or chills.  She is also stating that she was working out yesterday and started having bilateral trapezius neck pain and strain as well as left knee pain.  She lifts heavy weights and has had issues with her left knee previously and has known arthritis and has received cortisone shots.  She denies any calf pain.  She is able to bear weight.  She states the pain is worse with straightening her leg.    Clinical impression: Otitis externa of right ear, left knee pain, neck muscle strain    Differential diagnosis: Otitis externa, otitis media, otalgia, knee strain, knee dislocation, knee fracture, neck muscle  strain, neck fracture    Patient with right otitis externa with some redness to the TM.  She will be given antibiotics with follow-up with her primary care doctor or ENT as needed.  I discussed pain management with Tylenol.  She is given meloxicam for her knee and her neck.  Patient with no neurodeficit to her upper extremities.  She does not have any cervical spinal tenderness.  Strength and sensation intact.  X-ray of the left knee showed Moderate suprapatellar effusion.  Mild medial compartment narrowing left knee.  No acute fracture or dislocation.  Patient to follow-up with orthopedics for further evaluation of the knee pain.  There is no evidence of septic joint.  Patient given normal Sarros relaxer for the neck pain as well.  Precautions provided.    Problems Addressed:  Acute otitis externa of right ear, unspecified type: acute illness or injury  Acute pain of left knee: acute illness or injury  Strain of neck muscle, initial encounter: acute illness or injury    Amount and/or Complexity of Data Reviewed  Radiology: ordered and independent interpretation performed.     Details: Knee x-ray with no dislocation        Disposition and Plan     Clinical Impression:  1. Acute otitis externa of right ear, unspecified type    2. Strain of neck muscle, initial encounter    3. Acute pain of left knee         Disposition:  Discharge  2/24/2024 11:39 am    Follow-up:  Darnell Reis PA  [address] [place] [id number]  [phone]    Call       Timothy Gillette MD  [address] [address]  [phone]    Call             Medications Prescribed:  Discharge Medication List as of 2/24/2024 11:43 AM        START taking these medications    Details   amoxicillin clavulanate 875-125 MG Oral Tab Take 1 tablet by mouth 2 (two) times daily for 10 days., Normal, Disp-20 tablet, R-0      ciprofloxacin-dexamethasone (CIPRODEX) 0.3-0.1 % Otic Suspension Place 4 drops into the right  ear 2 (two) times daily for 7 days., Normal, Disp-1 each, R-0      Meloxicam 7.5 MG Oral Tab Take 1 tablet (7.5 mg total) by mouth daily for 10 days., Normal, Disp-10 tablet, R-0      cyclobenzaprine 10 MG Oral Tab Take 1 tablet (10 mg total) by mouth 3 (three) times daily as needed for Muscle spasms., Normal, Disp-20 tablet, R-0

## 2024-03-09 ENCOUNTER — HOSPITAL ENCOUNTER (OUTPATIENT)
Age: 49
Discharge: HOME | End: 2024-03-09
Attending: EMERGENCY MEDICINE
Payer: COMMERCIAL

## 2024-03-09 VITALS
TEMPERATURE: 98 F | BODY MASS INDEX: 27.4 KG/M2 | DIASTOLIC BLOOD PRESSURE: 70 MMHG | SYSTOLIC BLOOD PRESSURE: 121 MMHG | RESPIRATION RATE: 16 BRPM | HEIGHT: 69 IN | WEIGHT: 185 LBS | OXYGEN SATURATION: 99 % | HEART RATE: 77 BPM

## 2024-03-09 DIAGNOSIS — H65.491 CHRONIC OTITIS MEDIA OF RIGHT EAR WITH EFFUSION: Primary | ICD-10-CM

## 2024-03-09 PROCEDURE — 99213 OFFICE O/P EST LOW 20 MIN: CPT

## 2024-03-09 RX ORDER — METHYLPREDNISOLONE 4 MG/1
TABLET ORAL
Qty: 1 EACH | Refills: 0 | Status: SHIPPED | OUTPATIENT
Start: 2024-03-09

## 2024-03-09 NOTE — ED PROVIDER NOTES
Patient Seen in: [facility]      History     Chief Complaint   Patient presents with    Ear Problem Pain     Stated Complaint: Cold - Ear ache    Subjective:   HPI    48-year-old female presents to immediate care with continued right ear pressure and popping.  She states she was seen here 2 weeks ago and had some ear drainage as well as ear pressure and was told she had an ear infection and was given an eardrop and an antibiotic.  She states that the symptoms got better she finished the antibiotic around Tuesday this week but this some symptoms are still present.  Denies fevers, chills, ear drainage.    Objective:   Past Medical History:   Diagnosis Date    Anemia in pregnancy ([facility]) 07/26/2018    Anxiety state, unspecified     Depression     Hypertension affecting pregnancy ([facility]) 2017    Infertility, female     PONV (postoperative nausea and vomiting)               Past Surgical History:   Procedure Laterality Date    ADENOIDECTOMY  2003    tonsillectomy, adenoidectomy, uvuolopharyngopalatoplasty - for sleep apnea    ARTHROSCOPY OF JOINT UNLISTED      left knee    D&C AFTER DELIVERY      IVF PACKAGE  02/03/2018    twin pregnancy. date listed is embryo transfer date.    NEEDLE BIOPSY RIGHT  01/2018    Adenosis, Stromal Fibrosis    OTHER SURGICAL HISTORY  10/2019    abdominoplasty and lipo. Dr. Carrion [facility]    TONSILLECTOMY  age 20                Social History     Socioeconomic History    Marital status:    Tobacco Use    Smoking status: Every Day     Packs/day: 1.00     Years: 20.00     Additional pack years: 0.00     Total pack years: 20.00     Types: Cigarettes     Start date: 1/14/1994    Smokeless tobacco: Never    Tobacco comments:     did do wellbutrin in past   Vaping Use    Vaping Use: Never used   Substance and Sexual Activity    Alcohol use: Yes     Alcohol/week: 1.0 standard drink of alcohol     Types: 1 Glasses of wine per week     Comment: Daily     Drug use: No    Sexual  activity: Yes     Partners: Male              Review of Systems    Positive for stated complaint: Cold - Ear ache  Other systems are as noted in HPI.  Constitutional and vital signs reviewed.      All other systems reviewed and negative except as noted above.    Physical Exam     ED Triage Vitals [03/09/24 1016]   /70   Pulse 77   Resp 16   Temp 97.7 °F (36.5 °C)   Temp src Temporal   SpO2 99 %   O2 Device None (Room air)       Current:/70   Pulse 77   Temp 97.7 °F (36.5 °C) (Temporal)   Resp 16   Ht 175.3 cm (5' 9\")   Wt 83.9 kg   LMP 07/15/2019   SpO2 99%   BMI 27.32 kg/m²         Physical Exam    Ears: Right ear normal-appearing tympanic membrane, no bulging, no erythema, no drainage    ED Course   Labs Reviewed - No data to display                   MDM      Differential diagnosis acute versus chronic otitis media, chronic effusion    No sign of acute otitis media, no sign of infection.  Likely mild chronic effusion, will DC with prescription for Medrol Dosepak and told to follow-up with ENT.  Contact information for ENT given for follow-up.                               Medical Decision Making      Disposition and Plan     Clinical Impression:  1. Chronic otitis media of right ear with effusion         Disposition:  Discharge  3/9/2024 10:13 am    Follow-up:  Kevin Handy MD  [address]  [address] [place] [address]  [phone]    In 3 days            Medications Prescribed:  Current Discharge Medication List        START taking these medications    Details   methylPREDNISolone (MEDROL) 4 MG Oral Tablet Therapy Pack Dosepack: take as directed  Qty: 1 each, Refills: 0

## 2024-03-09 NOTE — DISCHARGE INSTRUCTIONS
Otitis Media With Effusion in Children and Adults  A Temporary Buildup of Fluid Affecting Hearing in Children and Adults    By Renetta Hall Published on May 15, 2023   Medically reviewed by Emely Deal MD  Print   Table of Contents  Causes in Adults  Types  Symptoms  Diagnosis  Treatment  Otitis media with effusion is a buildup of fluid behind the eardrum, sometimes called glue ear. It’s not an ear infection and doesn’t cause pain. The most prominent symptom is usually hearing loss. Acute otitis media with effusion resolves on its own within days or weeks, while chronic otitis media with effusion lasts three months or more.1 The condition is more common in children than adults, but it can happen to anyone.     This article will discuss otitis media with effusion (OME), including its causes and how it's diagnosed and treated.     Doctor looking in child's ear  [employer]    Why Do Adults Get Otitis Media With Effusion?  To understand otitis media with effusion, it’s helpful to know a bit about the anatomy of the ear. The eustachian tube connects the ear to the back of the throat. This tube allows fluid to drain into the  throat so it doesn’t collect in the ear. OME happens when the eustachian tube becomes closed or blocked. When this occurs, fluid can’t drain, so it remains in the middle ear behind the eardrum.2     OME is more common in children than adults. This is because children’s eustachian tubes are more horizontal, which makes drainage more difficult. As people grow, the tubes become longer and more angled, reducing the risk for otitis media with effusion.1    In adults, certain risk factors increase the odds of having otitis media with effusion. These include:3      Experiencing a cold, allergies or a sinus infection, all of which can cause the eustachian tubes to swell   Having large tonsils or adenoids, or a deviated septum, which can block the tubes   Having radiation therapy to the head or  neck  Recent changes to air pressure, including flying or diving  Tumors  Head or neck surgery  Types of Otitis Media With Effusion  Otitis media with effusion is classified by how long it lasts. Usually, it resolves independently within four to six weeks and does not require treatment.1 This is known as acute OME. Chronic OME is when the condition lasts longer than three months.     Neither acute nor chronic otitis media with effusion is serious or life-threatening. They don’t require a trip to the emergency room. However, it’s important to see a healthcare provider if you or your child experiences changes in hearing. Especially in childhood, hearing loss can lead to speech delays and may impact social development.     Symptoms: How Does Otitis Media With Effusion Feel?  The most common symptoms of otitis media with effusion is pain.2 Otitis media with effusion is sometimes called glue ear because of the sensation that the thick fluid behind the ear can cause.    Some adults with otitis media with effusion will experience symptoms including:3    A feeling of fullness in the ear  Popping or crackling noises in the ear  Mild pain or discomfort  Small changes to balance  Testing to Diagnose Otitis Media With Effusion  The first step to getting an OME diagnosis is seeing your primary care provider. They’ll look into your ear using a lighted tool called an otoscope. OME can make the eardrum look dull or with bubbles on its surface, and your provider may notice fluid behind the ear.2    Your healthcare provider might then recommend a tympanometry, a test that can measure the thickness of the fluid behind the eardrum. The test is painless and involves a tool similar to an ear thermometer, which uses sound waves to measure the fluid in the ear.     In addition, your provider may suggest a hearing test to see if you’re experiencing hearing loss.     Treatment Options for OME  In most cases, OME doesn’t require treatment and  will resolve on its own.3 However, chronic OME may need medical interventions. These can include:4    Over-the-counter (OTC) medications like antihistamines to treat underlying conditions, like allergies, that may be causing OME   Hearing aids to help with hearing loss   Ear tubes to allow for better ventilation and drainage of the middle ear  Surgery to remove the adenoids or tonsils if they are contributing   You should never try to poke around in your ear to encourage drainage.     When Does Otitis Media With Effusion Get Better?  Most cases of OME resolve within four to six weeks without treatment. If you have chronic OME, you should work with your healthcare provider to identify the underlying causes--from sinus disease to tumors--that might be contributing to your chronic OME. Addressing these factors can clear up the fluid behind your ear drum.5    Otitis media with effusion is a buildup of fluid behind the eardrum. It often doesn’t cause noticeable symptoms, but can lead to hearing loss that resolves when the fluid is no longer present. OME in adults is often linked to other health conditions, like allergies, sinus infection or tumors. Most cases of OME don’t require treatment, but it’s important to address any underlying health conditions that might be contributing to fluid behind your eardrum.

## 2024-08-27 ENCOUNTER — HOSPITAL ENCOUNTER (OUTPATIENT)
Age: 49
Discharge: HOME | End: 2024-08-27
Attending: EMERGENCY MEDICINE
Payer: COMMERCIAL

## 2024-08-27 VITALS
TEMPERATURE: 99 F | DIASTOLIC BLOOD PRESSURE: 87 MMHG | HEIGHT: 69 IN | HEART RATE: 63 BPM | BODY MASS INDEX: 28.88 KG/M2 | OXYGEN SATURATION: 100 % | WEIGHT: 195 LBS | SYSTOLIC BLOOD PRESSURE: 134 MMHG | RESPIRATION RATE: 16 BRPM

## 2024-08-27 DIAGNOSIS — R05.9 COUGH, UNSPECIFIED TYPE: Primary | ICD-10-CM

## 2024-08-27 PROCEDURE — 99213 OFFICE O/P EST LOW 20 MIN: CPT

## 2024-08-27 RX ORDER — BENZONATATE 100 MG/1
100 CAPSULE ORAL 3 TIMES DAILY PRN
Qty: 20 CAPSULE | Refills: 0 | Status: SHIPPED | OUTPATIENT
Start: 2024-08-27

## 2024-08-27 RX ORDER — CODEINE PHOSPHATE AND GUAIFENESIN 10; 100 MG/5ML; MG/5ML
10 SOLUTION ORAL 3 TIMES DAILY PRN
Qty: 120 ML | Refills: 0 | Status: SHIPPED | OUTPATIENT
Start: 2024-08-27

## 2024-08-27 RX ORDER — TIRZEPATIDE 2.5 MG/.5ML
INJECTION, SOLUTION SUBCUTANEOUS
COMMUNITY
Start: 2024-08-13

## 2024-08-27 RX ORDER — DOXYLAMINE SUCCINATE AND PYRIDOXINE HYDROCHLORIDE, DELAYED RELEASE TABLETS 10 MG/10 MG 10; 10 MG/1; MG/1
2 TABLET, DELAYED RELEASE ORAL DAILY
COMMUNITY

## 2024-08-27 NOTE — ED INITIAL ASSESSMENT (HPI)
C/o cough and nasal congestion for 10 days. Tickle in the throat and post nasal drip.  Taking Nyquil and Benzonatate-no relief

## 2024-08-27 NOTE — DISCHARGE INSTRUCTIONS
Follow-up with your primary care provider  Take Sudafed as needed for nasal congestion and postnasal drip  Use Flonase over-the-counter as directed  Take benzonatate cough medicine during the day for cough suppression  Take guaifenesin-codeine cough syrup at night  Return if any worsening symptoms or new concern

## 2024-08-27 NOTE — ED PROVIDER NOTES
Patient Seen in: [facility]      History     Chief Complaint   Patient presents with    Cough     Entered by patient     Stated Complaint: Cough    Subjective:   HPI    49-year-old female with a history of anxiety depression pregnancy-induced hypertension presents to the immediate care for complaints of persistent cough.  Patient states that she has had a persistent cough with postnasal drip.  She has used various over-the-counter medications including Afrin nasal spray, cough suppressant medication, benzonatate cough medicine, without any relief.  Denies any fevers or chills.  Denies any purulent sputum production.  There is no complaints of any chest pain or shortness of breath.    Objective:   Past Medical History:    Anemia in pregnancy (HCC)    Anxiety state, unspecified    Depression    Hypertension affecting pregnancy ([facility])    Infertility, female    PONV (postoperative nausea and vomiting)              Past Surgical History:   Procedure Laterality Date    Adenoidectomy  2003    tonsillectomy, adenoidectomy, uvuolopharyngopalatoplasty - for sleep apnea    Arthroscopy of joint unlisted      left knee    D&c after delivery      Ivf package  02/03/2018    twin pregnancy. date listed is embryo transfer date.    Needle biopsy right  01/2018    Adenosis, Stromal Fibrosis    Other surgical history  10/2019    abdominoplasty and lipo. Dr. Murali shermanTaylor    Tonsillectomy  age 20                Social History     Socioeconomic History    Marital status:    Tobacco Use    Smoking status: Every Day     Current packs/day: 1.00     Average packs/day: 1 pack/day for 30.6 years (30.6 ttl pk-yrs)     Types: Cigarettes     Start date: 1/14/1994    Smokeless tobacco: Never    Tobacco comments:     did do wellbutrin in past   Vaping Use    Vaping status: Never Used   Substance and Sexual Activity    Alcohol use: Yes     Alcohol/week: 1.0 standard drink of alcohol     Types: 1 Glasses of wine per week      Comment: Daily     Drug use: No    Sexual activity: Yes     Partners: Male              Review of Systems    Positive for stated Chief Complaint: Cough (Entered by patient)    Other systems are as noted in HPI.  Constitutional and vital signs reviewed.      All other systems reviewed and negative except as noted above.    Physical Exam     ED Triage Vitals [08/27/24 0824]   /87   Pulse 63   Resp 16   Temp 99 °F (37.2 °C)   Temp src Oral   SpO2 100 %   O2 Device None (Room air)       Current Vitals:   Vital Signs  BP: 134/87  Pulse: 63  Resp: 16  Temp: 99 °F (37.2 °C)  Temp src: Oral    Oxygen Therapy  SpO2: 100 %  O2 Device: None (Room air)            Physical Exam    General: Alert and oriented. No acute distress.  HEENT: Normocephalic. No evidence of trauma. Extraocular movements are intact.  Patient noted have some congestion of the nasal turbinates.  Cardiovascular exam: Regular rate and rhythm  Lungs: Clear to auscultation bilaterally.  Abdomen: Soft, nondistended, nontender.  Extremities: No evidence of deformity. No clubbing or cyanosis.  Neuro: No focal deficit is noted.    ED Course   Labs Reviewed - No data to display  Patient will be discharged home with benzonatate cough medicine during the day.  Should be provided guaifenesin with codeine cough syrup for nighttime.  Recommend Sudafed over-the-counter to help with any congestion.  Also recommend a trial of Flonase to see if this will also help with her nasal congestion postnasal drip.  Recommend follow-up with her primary care provider.         MDM   Patient was screened and evaluated during this visit.   As a treating physician attending to the patient, I determined, within reasonable clinical confidence and prior to discharge, that an emergency medical condition was not or was no longer present.  There was no indication for further evaluation, treatment or admission on an emergency basis.  Comprehensive verbal and written discharge and follow-up  instructions were provided to help prevent relapse or worsening.  Patient was instructed to follow-up with her primary care provider for further evaluation and treatment, but to return immediately to the ER for worsening, concerning, new, changing or persisting symptoms.  I discussed the case with the patient and they had no questions, complaints, or concerns.  Patient felt comfortable going home.    ^^Please note that this report has been produced using speech recognition software and may contain errors related to that system including, but not limited to, errors in grammar, punctuation, and spelling, as well as words and phrases that possibly may have been recognized inappropriately.  If there are any questions or concerns, contact the dictating provider for clarification                                   MDM    Disposition and Plan     Clinical Impression:  1. Cough, unspecified type         Disposition:  Discharge  8/27/2024  8:47 am    Follow-up:  Shea Franz DO  [address] [place] [id number]  [phone]    Call   As needed, If symptoms worsen          Medications Prescribed:  Current Discharge Medication List        START taking these medications    Details   guaiFENesin-codeine 100-10 MG/5ML Oral Solution Take 10 mL by mouth 3 (three) times daily as needed for cough.  Qty: 120 mL, Refills: 0    Associated Diagnoses: Cough, unspecified type      benzonatate 100 MG Oral Cap Take 1 capsule (100 mg total) by mouth 3 (three) times daily as needed for cough.  Qty: 20 capsule, Refills: 0    Associated Diagnoses: Cough, unspecified type

## 2024-12-27 ENCOUNTER — HOSPITAL ENCOUNTER (OUTPATIENT)
Age: 49
Discharge: HOME | End: 2024-12-27
Payer: COMMERCIAL

## 2024-12-27 VITALS
TEMPERATURE: 98 F | DIASTOLIC BLOOD PRESSURE: 78 MMHG | HEIGHT: 69 IN | WEIGHT: 185 LBS | OXYGEN SATURATION: 100 % | SYSTOLIC BLOOD PRESSURE: 126 MMHG | RESPIRATION RATE: 16 BRPM | HEART RATE: 77 BPM | BODY MASS INDEX: 27.4 KG/M2

## 2024-12-27 DIAGNOSIS — J01.00 ACUTE NON-RECURRENT MAXILLARY SINUSITIS: Primary | ICD-10-CM

## 2024-12-27 PROCEDURE — 99214 OFFICE O/P EST MOD 30 MIN: CPT

## 2024-12-27 PROCEDURE — 99213 OFFICE O/P EST LOW 20 MIN: CPT

## 2024-12-27 RX ORDER — METHYLPREDNISOLONE 4 MG/1
TABLET ORAL
Qty: 1 EACH | Refills: 0 | Status: SHIPPED | OUTPATIENT
Start: 2024-12-27

## 2024-12-27 NOTE — ED INITIAL ASSESSMENT (HPI)
Pt states she's been having cold symptoms for several days now, she states she's concerned about her glands on side of throat tender to the touch. Denies ear pain and sore throat. Runny nose, mucus thick yellow. Denies fevers, was sweating last night.

## 2024-12-27 NOTE — ED PROVIDER NOTES
Patient Seen in: [facility]      History     Chief Complaint   Patient presents with    Cold     Entered by patient     Stated Complaint: Cold    Subjective:   49-year-old female presents today with worsening sinus pressure congestion for little over a week.  Denies any nausea vomiting diarrhea.  No sore throat or cough.  Feels like her glands are more swollen than usual.  No vomiting diarrhea.  No other symptoms or concerns.  The patient's medication list, past medical history and social history elements as listed in today's nurse's notes were reviewed and agreed (except as otherwise stated in the HPI).  The patient's family history reviewed and determined to be noncontributory to the presenting problem              Objective:     Past Medical History:    Anemia in pregnancy (HCC)    Anxiety state, unspecified    Depression    Hypertension affecting pregnancy (HCC)    Infertility, female    PONV (postoperative nausea and vomiting)              Past Surgical History:   Procedure Laterality Date    Adenoidectomy  2003    tonsillectomy, adenoidectomy, uvuolopharyngopalatoplasty - for sleep apnea    Arthroscopy of joint unlisted      left knee    D&c after delivery      Ivf package  02/03/2018    twin pregnancy. date listed is embryo transfer date.    Needle biopsy right  01/2018    Adenosis, Stromal Fibrosis    Other surgical history  10/2019    abdominoplasty and lipo. Dr. [facility]    Tonsillectomy  age 20                Social History     Socioeconomic History    Marital status:    Tobacco Use    Smoking status: Every Day     Current packs/day: 1.00     Average packs/day: 1 pack/day for 31.0 years (31.0 ttl pk-yrs)     Types: Cigarettes     Start date: 1/14/1994    Smokeless tobacco: Never    Tobacco comments:     did do wellbutrin in past   Vaping Use    Vaping status: Never Used   Substance and Sexual Activity    Alcohol use: Yes     Alcohol/week: 1.0 standard drink of alcohol      Types: 1 Glasses of wine per week     Comment: Daily     Drug use: No    Sexual activity: Yes     Partners: Male              Review of Systems    Positive for stated complaint: Cold  Other systems are as noted in HPI.  Constitutional and vital signs reviewed.      All other systems reviewed and negative except as noted above.    Physical Exam     ED Triage Vitals [12/27/24 0925]   /78   Pulse 77   Resp 16   Temp 98.1 °F (36.7 °C)   Temp src Oral   SpO2 100 %   O2 Device None (Room air)       Current Vitals:   Vital Signs  BP: 126/78  Pulse: 77  Resp: 16  Temp: 98.1 °F (36.7 °C)  Temp src: Oral    Oxygen Therapy  SpO2: 100 %  O2 Device: None (Room air)        Physical Exam  Vitals and nursing note reviewed.   Constitutional:       Appearance: She is well-developed.   HENT:      Head: Normocephalic.      Right Ear: Tympanic membrane and ear canal normal.      Left Ear: Tympanic membrane and ear canal normal.      Nose: Mucosal edema and congestion present.      Mouth/Throat:      Mouth: Mucous membranes are moist.      Pharynx: Oropharynx is clear. Uvula midline.   Eyes:      Conjunctiva/sclera: Conjunctivae normal.      Pupils: Pupils are equal, round, and reactive to light.   Cardiovascular:      Rate and Rhythm: Normal rate and regular rhythm.   Pulmonary:      Effort: Pulmonary effort is normal.      Breath sounds: Normal breath sounds.   Musculoskeletal:      Cervical back: Normal range of motion and neck supple.   Lymphadenopathy:      Cervical: No cervical adenopathy.   Skin:     General: Skin is warm and dry.   Neurological:      Mental Status: She is alert and oriented to person, place, and time.             ED Course   Labs Reviewed - No data to display                MDM     Please note that this report has been produced using speech recognition software and may contain errors related to that system including, but not limited to, errors in grammar, punctuation, and spelling, as well as words and  phrases that possibly may have been recognized inappropriately.  If there are any questions or concerns, contact the dictating provider for clarification.              Medical Decision Making  Differential diagnosis includes but is not limited to: COVID-19, viral URI, strep throat, influenza, pneumonia, sinusitis, bronchitis      Presents today with worsening sinus pressure congestion.  Will treat for possible bacterial sinus infection due to length of time patient has been ill and presentation.  Patient given prescription for Augmentin as well as Medrol Dosepak to take as directed.  Supportive care was discussed.  To follow-up with her primary care physician in 1 week if symptoms do not improve.  Patient verbalized understanding and agreed to plan of care.    Risk  OTC drugs.  Prescription drug management.        Disposition and Plan     Clinical Impression:  1. Acute non-recurrent maxillary sinusitis         Disposition:  Discharge  12/27/2024  9:43 am    Follow-up:  Shea Franz DO  [address] [place] [address]  [phone]    In 1 week  As needed          Medications Prescribed:  Current Discharge Medication List        START taking these medications    Details   !! methylPREDNISolone (MEDROL) 4 MG Oral Tablet Therapy Pack Dosepack: take as directed  Qty: 1 each, Refills: 0      amoxicillin clavulanate 875-125 MG Oral Tab Take 1 tablet by mouth 2 (two) times daily for 7 days.  Qty: 14 tablet, Refills: 0       !! - Potential duplicate medications found. Please discuss with provider.              Supplementary Documentation:

## 2025-01-05 ENCOUNTER — HOSPITAL ENCOUNTER (OUTPATIENT)
Age: 50
Discharge: HOME | End: 2025-01-05
Payer: COMMERCIAL

## 2025-01-05 VITALS
WEIGHT: 185 LBS | RESPIRATION RATE: 20 BRPM | HEIGHT: 69 IN | DIASTOLIC BLOOD PRESSURE: 72 MMHG | OXYGEN SATURATION: 99 % | SYSTOLIC BLOOD PRESSURE: 113 MMHG | HEART RATE: 74 BPM | TEMPERATURE: 98 F | BODY MASS INDEX: 27.4 KG/M2

## 2025-01-05 DIAGNOSIS — J01.11 ACUTE RECURRENT FRONTAL SINUSITIS: Primary | ICD-10-CM

## 2025-01-05 PROCEDURE — 99214 OFFICE O/P EST MOD 30 MIN: CPT

## 2025-01-05 PROCEDURE — 99213 OFFICE O/P EST LOW 20 MIN: CPT

## 2025-01-05 RX ORDER — AMOXICILLIN AND CLAVULANATE POTASSIUM 562.5; 437.5; 62.5 MG/1; MG/1; MG/1
2 TABLET, MULTILAYER, EXTENDED RELEASE ORAL 2 TIMES DAILY
Qty: 28 TABLET | Refills: 0 | Status: SHIPPED | OUTPATIENT
Start: 2025-01-05 | End: 2025-01-12

## 2025-01-05 RX ORDER — OXYMETAZOLINE HYDROCHLORIDE 0.05 G/100ML
1 SPRAY NASAL 2 TIMES DAILY
Qty: 15 ML | Refills: 0 | Status: SHIPPED | OUTPATIENT
Start: 2025-01-05 | End: 2025-01-07

## 2025-01-05 RX ORDER — PSEUDOEPHEDRINE HCL 30 MG/1
30 TABLET, FILM COATED ORAL 3 TIMES DAILY
Qty: 30 TABLET | Refills: 0 | Status: SHIPPED | OUTPATIENT
Start: 2025-01-05 | End: 2025-01-15

## 2025-01-05 RX ORDER — FLUTICASONE PROPIONATE 50 MCG
2 SPRAY, SUSPENSION (ML) NASAL DAILY
Qty: 16 G | Refills: 0 | Status: SHIPPED | OUTPATIENT
Start: 2025-01-05 | End: 2025-02-04

## 2025-01-05 NOTE — ED PROVIDER NOTES
Patient Seen in: [facility]      History     Chief Complaint   Patient presents with    Cold     Entered by patient    Cough/URI     Stated Complaint: Cold sinus infection    Subjective:   HPI  Jennifer Melendrez is a 49 year old female presents with acute onset of sinus congestion for over 3 weeks. Patient reports  sinus congestion.  Seen in this facility on 12/27/2024 for the same complaint and received Augmentin and medrol dose pack with some relief.  Patient denies cough, rhinorrhea,dysphagia, throat pain, ear pain,  fevers, chills, shortness of breath, respiratory distress, stridor, neck pain/ stiffness, headache, eye pain/ redness, facial/ lip/ eyelid swelling. No medications taken prior to arrival. No alleviating/ aggravating factors. Patient is  concerned about COVID 19 infection at this encounter.  Patient is  immunized for COVID 19.              Objective:     Past Medical History:    Anemia in pregnancy (HCC)    Anxiety state, unspecified    Depression    Hypertension affecting pregnancy ([facility])    Infertility, female    PONV (postoperative nausea and vomiting)              Past Surgical History:   Procedure Laterality Date    Adenoidectomy  2003    tonsillectomy, adenoidectomy, uvuolopharyngopalatoplasty - for sleep apnea    Arthroscopy of joint unlisted      left knee    D&c after delivery      Ivf package  02/03/2018    twin pregnancy. date listed is embryo transfer date.    Needle biopsy right  01/2018    Adenosis, Stromal Fibrosis    Other surgical history  10/2019    abdominoplasty and lipo. Dr. Murali shermanHuntly    Tonsillectomy  age 20                Social History     Socioeconomic History    Marital status:    Tobacco Use    Smoking status: Every Day     Current packs/day: 1.00     Average packs/day: 1 pack/day for 31.0 years (31.0 ttl pk-yrs)     Types: Cigarettes     Start date: 1/14/1994    Smokeless tobacco: Never    Tobacco comments:     did do wellbutrin in past   Vaping  Use    Vaping status: Never Used   Substance and Sexual Activity    Alcohol use: Yes     Alcohol/week: 1.0 standard drink of alcohol     Types: 1 Glasses of wine per week     Comment: Daily     Drug use: No    Sexual activity: Yes     Partners: Male              Review of Systems   All other systems reviewed and are negative.      Positive for stated complaint: Cold sinus infection  Other systems are as noted in HPI.  Constitutional and vital signs reviewed.      All other systems reviewed and negative except as noted above.    Physical Exam     ED Triage Vitals [01/05/25 0904]   /72   Pulse 74   Resp 20   Temp 97.8 °F (36.6 °C)   Temp src Oral   SpO2 99 %   O2 Device None (Room air)       Current Vitals:   Vital Signs  BP: 113/72  Pulse: 74  Resp: 20  Temp: 97.8 °F (36.6 °C)  Temp src: Oral    Oxygen Therapy  SpO2: 99 %  O2 Device: None (Room air)        Physical Exam  Vitals and nursing note reviewed.   Constitutional:       General: She is not in acute distress.     Appearance: Normal appearance. She is normal weight. She is not ill-appearing, toxic-appearing or diaphoretic.   HENT:      Head: Normocephalic and atraumatic.      Comments: Bilateral frontal/ maxillary sinus tenderness to palpation and percussion        Right Ear: Tympanic membrane, ear canal and external ear normal.      Left Ear: Tympanic membrane, ear canal and external ear normal.      Nose: Congestion present. No rhinorrhea.      Mouth/Throat:      Mouth: Mucous membranes are moist.      Pharynx: Oropharynx is clear. No oropharyngeal exudate or posterior oropharyngeal erythema.   Eyes:      Extraocular Movements: Extraocular movements intact.      Conjunctiva/sclera: Conjunctivae normal.      Pupils: Pupils are equal, round, and reactive to light.   Pulmonary:      Effort: Pulmonary effort is normal. No respiratory distress.      Breath sounds: No stridor. No wheezing, rhonchi or rales.   Chest:      Chest wall: No tenderness.    Musculoskeletal:         General: No swelling, tenderness, deformity or signs of injury. Normal range of motion.      Cervical back: Normal range of motion and neck supple.   Skin:     General: Skin is warm and dry.      Capillary Refill: Capillary refill takes less than 2 seconds.      Coloration: Skin is not jaundiced or pale.      Findings: No bruising, erythema, lesion or rash.   Neurological:      General: No focal deficit present.      Mental Status: She is alert and oriented to person, place, and time. Mental status is at baseline.   Psychiatric:         Mood and Affect: Mood normal.         Behavior: Behavior normal.             ED Course   Labs Reviewed - No data to display                MDM             Medical Decision Making  49 year old female presents with recurrent frontal sinusitis.  Considerations to include but not limited to bronchitis vs pneumonia vs COVID 19 vs influenza A vs influenza B.  Patient is overall well-appearing, normotensive, nontachycardic, not dyspneic with oxygen saturation at 99% on room air  Plan   - labs: NONE  - SpO2 99% on room air which is adequate for patient   - discharge to home  - rx: Augmentin ER 2g po BID x 7 days. Pseudoephedrine 30mg po q 6 hours.   Afrin 2 sprays to bilateral nostrils bid for no more than 48 consecutive hours.  Flonase 2 sprays to bilateral nostrils.    - OTC:  ibuprofen 600mg po q8 hours/ prn. Tylenol 1g po q 6 hours/ prn.  guaifenasin-dextromethorphan po q 6 hours/ prn   - refer to primary care physician   - return to ED if symptoms worsens        Disposition and Plan     Clinical Impression:  1. Acute recurrent frontal sinusitis         Disposition:  Discharge  1/5/2025  9:28 am    Follow-up:  Shea Franz DO  [address] [place] [address]  [phone]          [facility]  [address] [address]  [phone]              Medications Prescribed:  Discharge Medication List as of 1/5/2025  9:43 AM         START taking these medications    Details   Amoxicillin-Pot Clavulanate ER 1000-62.5 MG Oral Tablet 12 Hr Take 2 tablets by mouth 2 (two) times daily for 7 days., Normal, Disp-28 tablet, R-0      pseudoephedrine 30 MG Oral Tab Take 1 tablet (30 mg total) by mouth in the morning, at noon, and at bedtime for 10 days., Normal, Disp-30 tablet, R-0      fluticasone propionate 50 MCG/ACT Nasal Suspension 2 sprays by Nasal route daily., Normal, Disp-16 g, R-0      oxymetazoline 0.05 % Nasal Solution 1 spray by Nasal route 2 (two) times daily for 2 days. 2 days on followed by 2 days off, Normal, Disp-15 mL, R-0                 Supplementary Documentation:

## 2025-03-22 ENCOUNTER — HOSPITAL ENCOUNTER (OUTPATIENT)
Age: 50
Discharge: HOME | End: 2025-03-22
Payer: COMMERCIAL

## 2025-03-22 VITALS
OXYGEN SATURATION: 99 % | SYSTOLIC BLOOD PRESSURE: 107 MMHG | RESPIRATION RATE: 18 BRPM | TEMPERATURE: 98 F | DIASTOLIC BLOOD PRESSURE: 72 MMHG | WEIGHT: 180 LBS | BODY MASS INDEX: 27 KG/M2 | HEART RATE: 69 BPM

## 2025-03-22 DIAGNOSIS — B34.9 VIRAL ILLNESS: Primary | ICD-10-CM

## 2025-03-22 DIAGNOSIS — J02.9 SORE THROAT: ICD-10-CM

## 2025-03-22 DIAGNOSIS — Z20.828 EXPOSURE TO INFLUENZA: ICD-10-CM

## 2025-03-22 LAB
POCT INFLUENZA A: NEGATIVE
POCT INFLUENZA B: NEGATIVE

## 2025-03-22 PROCEDURE — 87502 INFLUENZA DNA AMP PROBE: CPT | Performed by: NURSE PRACTITIONER

## 2025-03-22 PROCEDURE — 99212 OFFICE O/P EST SF 10 MIN: CPT

## 2025-03-22 PROCEDURE — 99213 OFFICE O/P EST LOW 20 MIN: CPT

## 2025-03-22 NOTE — DISCHARGE INSTRUCTIONS
Increase water intake 2-3 liters daily   Your dose of acetaminophen (Tylenol) is 1000 mg every 4 hours for pain or fevers as needed.     Your dose of ibuprofen is 600 mg every 6 hours for pain or fevers as needed.        Replace your toothbrush

## 2025-03-22 NOTE — ED PROVIDER NOTES
Patient Seen in: [facility]      History     Chief Complaint   Patient presents with    Cough/URI     Stated Complaint: Cold Symp    Subjective:   HPI      49-year-old female with hypertension, depression and anxiety presents today with complaints of not feeling well for the past week.  Patient states she was just seen today with her son who was positive with influenza B.    Objective:     Past Medical History:    Anemia in pregnancy (HCC)    Anxiety state, unspecified    Depression    Hypertension affecting pregnancy (HCC)    Infertility, female    PONV (postoperative nausea and vomiting)              Past Surgical History:   Procedure Laterality Date    Adenoidectomy  2003    tonsillectomy, adenoidectomy, uvuolopharyngopalatoplasty - for sleep apnea    Arthroscopy of joint unlisted      left knee    D&c after delivery      Ivf package  02/03/2018    twin pregnancy. date listed is embryo transfer date.    Needle biopsy right  01/2018    Adenosis, Stromal Fibrosis    Other surgical history  10/2019    [facility]    Tonsillectomy  age 20                Social History     Socioeconomic History    Marital status:    Tobacco Use    Smoking status: Every Day     Current packs/day: 1.00     Average packs/day: 1 pack/day for 31.2 years (31.2 ttl pk-yrs)     Types: Cigarettes     Start date: 1/14/1994    Smokeless tobacco: Never    Tobacco comments:     did do wellbutrin in past   Vaping Use    Vaping status: Never Used   Substance and Sexual Activity    Alcohol use: Yes     Alcohol/week: 1.0 standard drink of alcohol     Types: 1 Glasses of wine per week     Comment: Daily     Drug use: No    Sexual activity: Yes     Partners: Male              Review of Systems   Constitutional: Negative.    HENT:  Positive for sore throat.    Eyes: Negative.    Respiratory: Negative.     Cardiovascular: Negative.    Gastrointestinal: Negative.    Endocrine: Negative.     Genitourinary: Negative.    Musculoskeletal:  Positive for back pain.   Skin: Negative.    Allergic/Immunologic: Negative.    Neurological: Negative.    Hematological: Negative.    Psychiatric/Behavioral: Negative.         Positive for stated complaint: Cold Symp  Other systems are as noted in HPI.  Constitutional and vital signs reviewed.      All other systems reviewed and negative except as noted above.    Physical Exam     ED Triage Vitals [03/22/25 0948]   /72   Pulse 69   Resp 18   Temp 98 °F (36.7 °C)   Temp src Oral   SpO2 99 %   O2 Device None (Room air)       Current Vitals:   Vital Signs  BP: 107/72  Pulse: 69  Resp: 18  Temp: 98 °F (36.7 °C)  Temp src: Oral    Oxygen Therapy  SpO2: 99 %  O2 Device: None (Room air)        Physical Exam  Vitals and nursing note reviewed.   Constitutional:       Appearance: Normal appearance. She is normal weight.   HENT:      Head: Normocephalic.      Right Ear: External ear normal.      Left Ear: External ear normal.      Mouth/Throat:      Mouth: Mucous membranes are moist.      Pharynx: Oropharynx is clear.   Eyes:      Extraocular Movements: Extraocular movements intact.      Conjunctiva/sclera: Conjunctivae normal.      Pupils: Pupils are equal, round, and reactive to light.   Cardiovascular:      Rate and Rhythm: Normal rate and regular rhythm.      Pulses: Normal pulses.      Heart sounds: Normal heart sounds.   Pulmonary:      Effort: Pulmonary effort is normal.      Breath sounds: Normal breath sounds.   Musculoskeletal:      Cervical back: Normal range of motion and neck supple.   Neurological:      General: No focal deficit present.      Mental Status: She is alert.           ED Course     Labs Reviewed   POCT FLU TEST - Normal    Narrative:     This assay is a rapid molecular in vitro test utilizing nucleic acid amplification of influenza A and B viral RNA.                   MDM      49-year-old female with hypertension, depression and anxiety presents  today with complaints of not feeling well for the past week.  Patient states she was just seen today with her son who was positive with influenza B.  VS: See EMR  Physical Exam: See exam  Diff Diagnosis: Flu exposure, influenza, viral illness.   Recent Results (from the past 24 hours)   POCT Flu Test    Collection Time: 03/22/25  9:53 AM    Specimen: Nares; Other   Result Value Ref Range    POCT INFLUENZA A Negative Negative    POCT INFLUENZA B Negative Negative   Based on physical exam and HPI will diagnosed with influenza exposure and viral illness.  Patient instructed to treat her symptoms supportively with rest hydration Tylenol Motrin and replace her toothbrush.        Medical Decision Making  49-year-old female with hypertension, depression and anxiety presents today with complaints of not feeling well for the past week.  Patient states she was just seen today with her son who was positive with influenza B.    Problems Addressed:  Exposure to influenza: acute illness or injury  Sore throat: acute illness or injury  Viral illness: acute illness or injury    Amount and/or Complexity of Data Reviewed  Labs: ordered. Decision-making details documented in ED Course.     Details: Recent Results (from the past 24 hours)  -POCT Flu Test:   Collection Time: 03/22/25  9:53 AM  Specimen: Nares; Other       Result                      Value             Ref Range           POCT INFLUENZA A            Negative          Negative            POCT INFLUENZA B            Negative          Negative           Risk  OTC drugs.        Disposition and Plan     Clinical Impression:  1. Viral illness    2. Exposure to influenza    3. Sore throat         Disposition:  Discharge  3/22/2025 10:11 am    Follow-up:  Shea Franz DO  [address] [place] [id number]  [phone]    In 1 week            Medications Prescribed:  Current Discharge Medication List              Supplementary Documentation:

## (undated) NOTE — MR AVS SNAPSHOT
After Visit Summary   2017    Toney Townsend    MRN: [id number]           Visit Information        Provider Department Dept Phone    2017  9:00 AM  [id number]   Outpt [phone]      Your Vitals Were     BP Pulse Wt LMP Return in about 8 weeks (around 3/10/2017) for growth US. Educational Information     Healthy Diet and Regular Exercise  [employer] for making healthy food choices  -   Enjoy your food, but eat less.   Fully enjo complete it and provide feedback. We strive to deliver the best patient experience and are looking for ways to make improvements. Your feedback will help us do so. For more information on [employer], please visit [email or web address]

## (undated) NOTE — IP AVS SNAPSHOT
[facility] [place] [address] [place], [address] ~ [phone]                Discharge Summary   2/21/2017    Tessa Bamberger Ramer           Admission Information        Provider Department    2/21/2017 Jesus Bai MD  1nw-A ASK your doctor about these medications        Instructions Authorizing Provider    Morning Afternoon Evening As Needed    Fish Oil 1000 MG Cpdr        Take 2 capsules by mouth.       [    ]    [    ]    [    ]    [    ]       PATIENT Pamela Mayers up to 6 weeks following childbirth.   Contact your doctor or midwife immediately if you experience any of the following symptoms:  · Headache that does not go away  · Visual changes (seeing spots, blurred vision or partial vision loss)  · Feeling nauseated 9.6 (01/30/17)  1.3   (01/30/17)  5.98 (01/30/17)  1.73 (01/30/17)  0.83 (H) (01/30/17)  0.11 (01/30/17)  3.21      Metabolic Lab Results  (Last result in the past 90 days)    HgbA1C Glucose BUN Creatinine Calcium Alkaline Phosph AST    -- (02/21/17)  95 ( [email or web address]. If you've recently had a stay at the Hospital you can access your discharge instructions in [employer] by going to Visits < Admission Summaries.  If you've been to the Emergency Department or your doctor's office, you can view yo

## (undated) NOTE — MR AVS SNAPSHOT
[facility]  [address] [facility] [facility] [address]  [phone]               Thank you for choosing us for your health care visit with PatKera Guevara.   We are glad to serve you and happy to provide you with this summary cluster feeding, therefore try to increase pumping frequency to at least 8-12 times every 24 hours. ? Keep pumping log with 24 hour collection totals to monitor milk supply.   ? Once your milk is in (3-5 days post-delivery), pump until the sprays of milk Platinum) or switching pump back to “stimulation” phase (Medela pumps) to stimulate further milk ejection reflexes. Then decrease speed once milk begins to flow again. ? Pump after you’ve seen, held, or touched your baby.  Discuss “kangaroo care” with you needs.  If you would like to meet with one of the Lactation Consultants while visiting your baby, you can request a visit by calling [phone] or notify your baby’s nurse for arrangements to be made.      Breastfeeding Suggestions for Abundant Milk Suppl feeding. · Try using one breast for any feedings within a 3 hour period. · Then use other breast for the next 3 hours. · Offer both breasts if your baby is not content with one.      Prevent plugged ducts and mastitis  · If your baby is content after on Should gain about 1 oz per day (minimum 5-7 oz per week)    For additional information: [employer]        Guidelines for Using a Nipple Shield    Refer to the [employer]. These are additional suggestions only. ?  This thin malena few attempts before your baby settles into a rhythmical suckling pattern. ? After several minutes of regular swallowing at the breast, you may want to try to reattach your baby to your breast without the shield. ?  When your baby’s swallowing slows on the ? Let your baby’s health care provider know immediately if it is difficult for you to feed your baby or if the number of wet or stool diapers is inadequate.    ? Follow-up visits with your lactation consultant and baby’s health care provider are necessary w Visit [facility] online at  [email or web address]

## (undated) NOTE — IP AVS SNAPSHOT
[facility] [place] [address] [place], [address] ~ [phone]                Discharge Summary   2/13/2017    Lee Melendrez           Admission Information        Provider Department    2/13/2017 Alejandro Liu MD  1nw-A Take 1 tablet by mouth daily.       [    ]    [    ]    [    ]    [    ]                 Patient Instructions        Labor Discharge Instructions    Call your OB doctor if you have any of the following sings:    · Period-like cramps that may come an [address] [facility])    [address]   [phone]              Immunization History as of 2/13/2017  Never Reviewed    No immunizations on file.       OB Lab Results  (Last 3 resu coverage. Patient [address] is a Federal Navigator program that can help with your Affordable Care Act coverage, as well as all types of Medicaid plans.   To get signed up and covered, please call [phone] and ask to get set up for an insuran

## (undated) NOTE — MR AVS SNAPSHOT
[facility]  [address] [place] [address] [facility] [address]  [phone]               Thank you for choosing us for your health care visit with Melvi Bueno.   We are glad to serve you and happy to provide you with this summary discharge instructions in [employer] by going to Visits < Admission Summaries. If you've been to the Emergency Department or your doctor's office, you can view your past visit information in [employer] by going to Visits < Visit Summaries. Jdguanjia questions?

## (undated) NOTE — LETTER
[facility] [address], [address]    Consent for Operation    Date: _8/11/18___    Time: __verbal consent at [phone]; Written consent: [phone]   I authorize the performance upon Mikey Leavitt the following operation: procedure has been videotaped, the surgeon will obtain the original videotape. The hospital will not be responsible for storage or maintenance of this tape.     6. For the purpose of advancing medical education, I consent to the admittance of observers to t STATEMENTS REQUIRING INSERTION OR COMPLETION WERE FILLED IN.     Signature of Patient:   ___________________________    When the patient is a minor or mentally incompetent to give consent:  Signature of person authorized to consent for patient: ____________ these medicines may increase my risk of anesthetic complications. · If I am allergic to anything or have had a reaction to anesthesia before. 3. I understand how the anesthesia medicine will help me (benefits).     4. I understand that with any type of patient’s representative) and answered their questions. The patient or their representative has agreed to have anesthesia services.     _____________________________________________________________________________  Witness        Date   Time  I have brunilda

## (undated) NOTE — Clinical Note
1.  Weekly NST's at 34 weeks // Twice weekly testing at 38 weeks - weekly NST and weekly BPP. 2.  Follow-up Growth ultrasound at 32 weeks. 3.  Delivery at 39-40 weeks.